# Patient Record
Sex: MALE | Race: WHITE | NOT HISPANIC OR LATINO | Employment: FULL TIME | URBAN - METROPOLITAN AREA
[De-identification: names, ages, dates, MRNs, and addresses within clinical notes are randomized per-mention and may not be internally consistent; named-entity substitution may affect disease eponyms.]

---

## 2023-05-04 LAB
APPEARANCE, URINE: CLEAR
BILIRUBIN, URINE: NEGATIVE
BLOOD, URINE: NEGATIVE
COLOR, URINE: YELLOW
GLUCOSE, URINE: NEGATIVE MG/DL
KETONES, URINE: NEGATIVE MG/DL
LEUKOCYTE ESTERASE, URINE: NEGATIVE
NITRITE, URINE: NEGATIVE
PH, URINE: 6 (ref 5–8)
PROTEIN, URINE: NEGATIVE MG/DL
SPECIFIC GRAVITY, URINE: 1.02 (ref 1–1.03)
UROBILINOGEN, URINE: <2 MG/DL (ref 0–1.9)

## 2023-05-05 LAB — URINE CULTURE: NORMAL

## 2024-01-19 ENCOUNTER — OFFICE VISIT (OUTPATIENT)
Dept: OPHTHALMOLOGY | Facility: CLINIC | Age: 54
End: 2024-01-19
Payer: COMMERCIAL

## 2024-01-19 DIAGNOSIS — H00.15 CHALAZION OF LEFT LOWER EYELID: ICD-10-CM

## 2024-01-19 DIAGNOSIS — H25.13 AGE-RELATED NUCLEAR CATARACT OF BOTH EYES: Primary | ICD-10-CM

## 2024-01-19 DIAGNOSIS — H02.889 MEIBOMIAN GLAND DYSFUNCTION: ICD-10-CM

## 2024-01-19 DIAGNOSIS — H52.7 REFRACTIVE ERROR: ICD-10-CM

## 2024-01-19 PROCEDURE — 99214 OFFICE O/P EST MOD 30 MIN: CPT | Performed by: OPHTHALMOLOGY

## 2024-01-19 RX ORDER — ASPIRIN 81 MG/1
TABLET ORAL
COMMUNITY
Start: 2020-09-03

## 2024-01-19 RX ORDER — MULTIVITAMIN
TABLET ORAL EVERY 24 HOURS
COMMUNITY

## 2024-01-19 RX ORDER — TOBRAMYCIN AND DEXAMETHASONE 3; 1 MG/ML; MG/ML
1 SUSPENSION/ DROPS OPHTHALMIC 3 TIMES DAILY
Qty: 5 ML | Refills: 1 | Status: SHIPPED | OUTPATIENT
Start: 2024-01-19 | End: 2024-02-06 | Stop reason: DRUGHIGH

## 2024-01-19 RX ORDER — POLYMYXIN B SULFATE AND TRIMETHOPRIM 1; 10000 MG/ML; [USP'U]/ML
SOLUTION OPHTHALMIC EVERY 6 HOURS
COMMUNITY
Start: 2023-09-21 | End: 2024-01-19 | Stop reason: ALTCHOICE

## 2024-01-19 RX ORDER — TOBRAMYCIN AND DEXAMETHASONE 3; 1 MG/ML; MG/ML
1 SUSPENSION/ DROPS OPHTHALMIC 3 TIMES DAILY
Qty: 5 ML | Refills: 1 | Status: SHIPPED | OUTPATIENT
Start: 2024-01-19 | End: 2024-01-19 | Stop reason: SDUPTHER

## 2024-01-19 ASSESSMENT — REFRACTION_WEARINGRX
SPECS_TYPE: READERS
OD_SPHERE: +1.50
OS_SPHERE: +1.50

## 2024-01-19 ASSESSMENT — CUP TO DISC RATIO
OS_RATIO: 0.25
OD_RATIO: 0.25

## 2024-01-19 ASSESSMENT — ENCOUNTER SYMPTOMS
MUSCULOSKELETAL NEGATIVE: 0
PSYCHIATRIC NEGATIVE: 0
RESPIRATORY NEGATIVE: 0
ENDOCRINE NEGATIVE: 0
NEUROLOGICAL NEGATIVE: 0
OCCASIONAL FEELINGS OF UNSTEADINESS: 0
LOSS OF SENSATION IN FEET: 0
EYES NEGATIVE: 0
CARDIOVASCULAR NEGATIVE: 0
HEMATOLOGIC/LYMPHATIC NEGATIVE: 0
ALLERGIC/IMMUNOLOGIC NEGATIVE: 0
CONSTITUTIONAL NEGATIVE: 0
GASTROINTESTINAL NEGATIVE: 0
DEPRESSION: 0

## 2024-01-19 ASSESSMENT — TONOMETRY
IOP_METHOD: GOLDMANN APPLANATION
OS_IOP_MMHG: 19
OD_IOP_MMHG: 16

## 2024-01-19 ASSESSMENT — KERATOMETRY
OS_AXISANGLE2_DEGREES: 155
OD_K1POWER_DIOPTERS: 40.50
OD_K2POWER_DIOPTERS: 40.50
OS_K1POWER_DIOPTERS: 40.25
OS_AXISANGLE_DEGREES: 65
OS_K2POWER_DIOPTERS: 40.75
OD_AXISANGLE_DEGREES: 90
OD_AXISANGLE2_DEGREES: 180

## 2024-01-19 ASSESSMENT — PATIENT HEALTH QUESTIONNAIRE - PHQ9
SUM OF ALL RESPONSES TO PHQ9 QUESTIONS 1 AND 2: 0
1. LITTLE INTEREST OR PLEASURE IN DOING THINGS: NOT AT ALL
2. FEELING DOWN, DEPRESSED OR HOPELESS: NOT AT ALL

## 2024-01-19 ASSESSMENT — EXTERNAL EXAM - RIGHT EYE: OD_EXAM: NORMAL

## 2024-01-19 ASSESSMENT — EXTERNAL EXAM - LEFT EYE: OS_EXAM: NORMAL

## 2024-01-19 ASSESSMENT — VISUAL ACUITY
OS_SC+: +1
OD_SC: 20/20
OS_SC: 20/25
METHOD: SNELLEN - LINEAR

## 2024-01-19 ASSESSMENT — PAIN SCALES - GENERAL: PAINLEVEL: 0-NO PAIN

## 2024-01-19 NOTE — ASSESSMENT & PLAN NOTE
Some hyperopia left eye (OS) and suspect needs stronger readers. In setting of lid and surface issues, will hold on RX for now and have trial some stronger readers.

## 2024-01-19 NOTE — ASSESSMENT & PLAN NOTE
Significant lid margin changes both eyes (OU), advised on starting regular warm compress and lid scrubs. Will be doing course of tobradex for chalazia.

## 2024-01-19 NOTE — ASSESSMENT & PLAN NOTE
More recent lower lid left eye (OS), older more chronic lesion right eye (OD). Will start course of tobradex and see how respond. May ultimately need excision of both.

## 2024-01-19 NOTE — PATIENT INSTRUCTIONS
Thank you so much for choosing me to provide your care today!    If you were dilated your vision may remain blurry   or light sensitive for several hours.    The nature of eye and vision problems can require frequent follow up, please make every effort to adhere to any future appointments.    If you have any issues, questions, or concerns,   please do not hesitate to reach out.    If you receive a survey in regards to your care today, please mention any exceptional care my office staff and/or technicians provided.    You can reach our office at this number:  [phone]     Eyelid Care    [x]Warm compress 1-2 time(s) daily to both eye(s)  10-15 minutes on closed eyes  To make a reusable compress:  Place 1 cup dry uncooked rice in a clean sock  Tie a knot  Microwave for 10-20 seconds, may add more time as needed  Compress should be warm but not hot   Replace if soiled or develops bad odor  May follow compress with gentle eyelid massage    [x]Eyelid scrub 1 time(s) daily to both eye(s)  Scrub technique  Apply small amount of baby shampoo/mild soap and water to fingertips  Work into a lather and gently scrub closed lids for 10-15 seconds  Keeping eyes closed, rinse with fresh water

## 2024-01-19 NOTE — PROGRESS NOTES
Assessment/Plan   Problem List Items Addressed This Visit          Eye/Vision problems    Age-related nuclear cataract of both eyes - Primary     Non significant cataract noted on exam. Will plan to continue to monitor with serial exam.            Meibomian gland dysfunction     Significant lid margin changes both eyes (OU), advised on starting regular warm compress and lid scrubs. Will be doing course of tobradex for chalazia.          Chalazion of left lower eyelid     More recent lower lid left eye (OS), older more chronic lesion right eye (OD). Will start course of tobradex and see how respond. May ultimately need excision of both.          Relevant Medications    tobramycin-dexamethasone (Tobradex) ophthalmic suspension    Refractive error     Some hyperopia left eye (OS) and suspect needs stronger readers. In setting of lid and surface issues, will hold on RX for now and have trial some stronger readers.             Provided reassurance regarding above diagnoses and care received in the office visit today. Discussed outcomes and options along with the importance of treatment compliance. Understands the importance of any follow up visits. Patient instructed to call/communicate with our office if any new issues, questions, or concerns.     Will plan to see back in few weeks for short check or sooner PRN

## 2024-01-25 ENCOUNTER — APPOINTMENT (OUTPATIENT)
Dept: OPHTHALMOLOGY | Facility: CLINIC | Age: 54
End: 2024-01-25
Payer: COMMERCIAL

## 2024-02-05 ENCOUNTER — NURSE TRIAGE (OUTPATIENT)
Dept: HEMATOLOGY/ONCOLOGY | Facility: HOSPITAL | Age: 54
End: 2024-02-05
Payer: COMMERCIAL

## 2024-02-05 ENCOUNTER — TELEPHONE (OUTPATIENT)
Dept: HEMATOLOGY/ONCOLOGY | Facility: CLINIC | Age: 54
End: 2024-02-05

## 2024-02-05 DIAGNOSIS — R42 DIZZINESS: ICD-10-CM

## 2024-02-05 DIAGNOSIS — C43.9 METASTATIC MELANOMA (MULTI): Primary | ICD-10-CM

## 2024-02-05 DIAGNOSIS — R51.9 ACUTE NONINTRACTABLE HEADACHE, UNSPECIFIED HEADACHE TYPE: ICD-10-CM

## 2024-02-05 NOTE — TELEPHONE ENCOUNTER
"Additional Information   Commented on: Are you having any pain? Where? On a scale or 0 - 10 with 0 being no pain and 10 being the worst pain ever, how would you rate your pain?     headache   Commented on: What else do you want to tell me about this problem?     Patient calls to state that shortly after [place], he began to experience headaches, sinus pressure and dizziness. He states that the headaches are located in the front of his head and he rates th pain a 5-6/10. He notes that the time of the day that the headaches appear varies and does not seem to be positional in nature. He will sometimes take Tylenol with minimal relief. He states that he has a headache \"most days.\"    He also feels sinus pressure behind his eyes and his head feels \"full.\" He does endorse ongoing mild congestion with clear drainage but states that he does not have any cold or flu symptoms. Denies fever/chills or any sick contacts.    Endorses dizziness that is intermittent and also not positional.No falls. He states that he tries to stay hydrated. Denies SOB, chest pain, N/V or constipation/diarrhea.    Patient states that he is worried that he had \"melanoma on my face and just want to make sure that nothing is going on in my head,\"    Protocols used: Other    Message sent to team.  "

## 2024-02-05 NOTE — TELEPHONE ENCOUNTER
Edited to add: patient initially thought that these symptoms were related to his eyesight. He saw his ophthalmologist who cleared him and was advised to change his reading glasses, which he did. This did not relieve his symptoms.

## 2024-02-05 NOTE — TELEPHONE ENCOUNTER
Per Gia Blackman CNP, she will order imaging and schedule a FUV with pt to go over results. She will follow up with him tomorrow.    Called Elliot back to communicate the plan of care. Understanding verbalized with teach back. No further needs at this time.

## 2024-02-06 ENCOUNTER — OFFICE VISIT (OUTPATIENT)
Dept: OPHTHALMOLOGY | Facility: CLINIC | Age: 54
End: 2024-02-06
Payer: COMMERCIAL

## 2024-02-06 ENCOUNTER — TELEPHONE (OUTPATIENT)
Dept: HEMATOLOGY/ONCOLOGY | Facility: CLINIC | Age: 54
End: 2024-02-06

## 2024-02-06 DIAGNOSIS — H00.15 CHALAZION OF LEFT LOWER EYELID: ICD-10-CM

## 2024-02-06 PROCEDURE — 99212 OFFICE O/P EST SF 10 MIN: CPT | Performed by: OPHTHALMOLOGY

## 2024-02-06 RX ORDER — TOBRAMYCIN AND DEXAMETHASONE 3; 1 MG/ML; MG/ML
SUSPENSION/ DROPS OPHTHALMIC
Qty: 5 ML | Refills: 1 | Status: SHIPPED
Start: 2024-02-06 | End: 2024-03-01 | Stop reason: WASHOUT

## 2024-02-06 ASSESSMENT — ENCOUNTER SYMPTOMS
MUSCULOSKELETAL NEGATIVE: 0
HEMATOLOGIC/LYMPHATIC NEGATIVE: 0
PSYCHIATRIC NEGATIVE: 0
ALLERGIC/IMMUNOLOGIC NEGATIVE: 0
NEUROLOGICAL NEGATIVE: 0
RESPIRATORY NEGATIVE: 0
DEPRESSION: 0
ENDOCRINE NEGATIVE: 0
EYES NEGATIVE: 0
OCCASIONAL FEELINGS OF UNSTEADINESS: 0
CONSTITUTIONAL NEGATIVE: 0
LOSS OF SENSATION IN FEET: 0
GASTROINTESTINAL NEGATIVE: 0
CARDIOVASCULAR NEGATIVE: 0

## 2024-02-06 ASSESSMENT — VISUAL ACUITY
OD_SC+: -2
OS_SC+: -1
METHOD: SNELLEN - LINEAR
OD_SC: 20/25
OS_SC: 20/30

## 2024-02-06 ASSESSMENT — PATIENT HEALTH QUESTIONNAIRE - PHQ9
SUM OF ALL RESPONSES TO PHQ9 QUESTIONS 1 AND 2: 0
2. FEELING DOWN, DEPRESSED OR HOPELESS: NOT AT ALL
1. LITTLE INTEREST OR PLEASURE IN DOING THINGS: NOT AT ALL

## 2024-02-06 ASSESSMENT — TONOMETRY
OS_IOP_MMHG: 20
IOP_METHOD: GOLDMANN APPLANATION

## 2024-02-06 ASSESSMENT — EXTERNAL EXAM - RIGHT EYE: OD_EXAM: NORMAL

## 2024-02-06 ASSESSMENT — PAIN SCALES - GENERAL: PAINLEVEL: 0-NO PAIN

## 2024-02-06 ASSESSMENT — EXTERNAL EXAM - LEFT EYE: OS_EXAM: NORMAL

## 2024-02-06 NOTE — PATIENT INSTRUCTIONS
Thank you so much for choosing me to provide your care today!    If you were dilated your vision may remain blurry   or light sensitive for several hours.    The nature of eye and vision problems can require frequent follow up, please make every effort to adhere to any future appointments.    If you have any issues, questions, or concerns,   please do not hesitate to reach out.    If you receive a survey in regards to your care today, please mention any exceptional care my office staff and/or technicians provided.    You can reach our office at this number:  [phone]

## 2024-02-06 NOTE — ASSESSMENT & PLAN NOTE
Both upper and lower chalazia appear less inflammed, still with significant nodules. Will plan excision next visit. Start taper off drops in interim and will continue heat as best possible. To call if any new issues in interim.

## 2024-02-06 NOTE — PROGRESS NOTES
Assessment/Plan   Problem List Items Addressed This Visit          Eye/Vision problems    Chalazion of left lower eyelid     Both upper and lower chalazia appear less inflammed, still with significant nodules. Will plan excision next visit. Start taper off drops in interim and will continue heat as best possible. To call if any new issues in interim.          Relevant Medications    tobramycin-dexamethasone (Tobradex) ophthalmic suspension       Provided reassurance regarding above diagnoses and care received in the office visit today. Discussed outcomes and options along with the importance of treatment compliance. Understands the importance of any follow up visits. Patient instructed to call/communicate with our office if any new issues, questions, or concerns.     Will plan to see back in few weeks for chalazion excision upper and lower OS or sooner PRN

## 2024-02-08 ENCOUNTER — HOSPITAL ENCOUNTER (OUTPATIENT)
Dept: RADIOLOGY | Facility: CLINIC | Age: 54
Discharge: HOME | End: 2024-02-08
Payer: COMMERCIAL

## 2024-02-08 VITALS — WEIGHT: 195 LBS | HEIGHT: 70 IN | BODY MASS INDEX: 27.92 KG/M2

## 2024-02-08 DIAGNOSIS — C43.9 METASTATIC MELANOMA (MULTI): ICD-10-CM

## 2024-02-08 DIAGNOSIS — R51.9 ACUTE NONINTRACTABLE HEADACHE, UNSPECIFIED HEADACHE TYPE: ICD-10-CM

## 2024-02-08 DIAGNOSIS — R42 DIZZINESS: ICD-10-CM

## 2024-02-08 PROCEDURE — A9575 INJ GADOTERATE MEGLUMI 0.1ML: HCPCS | Performed by: NURSE PRACTITIONER

## 2024-02-08 PROCEDURE — 70553 MRI BRAIN STEM W/O & W/DYE: CPT

## 2024-02-08 PROCEDURE — 2500000004 HC RX 250 GENERAL PHARMACY W/ HCPCS (ALT 636 FOR OP/ED): Performed by: NURSE PRACTITIONER

## 2024-02-08 PROCEDURE — 70553 MRI BRAIN STEM W/O & W/DYE: CPT | Performed by: RADIOLOGY

## 2024-02-08 RX ORDER — GADOTERATE MEGLUMINE 376.9 MG/ML
18 INJECTION INTRAVENOUS
Status: COMPLETED | OUTPATIENT
Start: 2024-02-08 | End: 2024-02-08

## 2024-02-08 RX ADMIN — GADOTERATE MEGLUMINE 18 ML: 376.9 INJECTION INTRAVENOUS at 09:25

## 2024-02-13 ENCOUNTER — OFFICE VISIT (OUTPATIENT)
Dept: HEMATOLOGY/ONCOLOGY | Facility: HOSPITAL | Age: 54
End: 2024-02-13
Payer: COMMERCIAL

## 2024-02-13 VITALS
HEIGHT: 69 IN | DIASTOLIC BLOOD PRESSURE: 90 MMHG | OXYGEN SATURATION: 100 % | SYSTOLIC BLOOD PRESSURE: 153 MMHG | RESPIRATION RATE: 20 BRPM | HEART RATE: 80 BPM | WEIGHT: 202.82 LBS | BODY MASS INDEX: 30.04 KG/M2 | TEMPERATURE: 96.8 F

## 2024-02-13 DIAGNOSIS — C77.9 MELANOMA METASTATIC TO LYMPH NODE (MULTI): ICD-10-CM

## 2024-02-13 DIAGNOSIS — C43.9 MELANOMA OF SKIN (MULTI): Primary | ICD-10-CM

## 2024-02-13 DIAGNOSIS — R51.9 NONINTRACTABLE HEADACHE, UNSPECIFIED CHRONICITY PATTERN, UNSPECIFIED HEADACHE TYPE: ICD-10-CM

## 2024-02-13 PROCEDURE — 1036F TOBACCO NON-USER: CPT | Performed by: INTERNAL MEDICINE

## 2024-02-13 PROCEDURE — 99215 OFFICE O/P EST HI 40 MIN: CPT | Performed by: INTERNAL MEDICINE

## 2024-02-13 ASSESSMENT — PAIN SCALES - GENERAL: PAINLEVEL: 0-NO PAIN

## 2024-02-14 PROBLEM — C43.9 MELANOMA OF SKIN (MULTI): Status: ACTIVE | Noted: 2024-02-14

## 2024-02-14 PROBLEM — C77.9 MELANOMA METASTATIC TO LYMPH NODE (MULTI): Status: ACTIVE | Noted: 2024-02-14

## 2024-02-14 PROBLEM — R51.9 NONINTRACTABLE HEADACHE: Status: ACTIVE | Noted: 2024-02-14

## 2024-02-14 ASSESSMENT — ENCOUNTER SYMPTOMS
HEMOPTYSIS: 0
DIZZINESS: 0
DIARRHEA: 0
EXTREMITY WEAKNESS: 0
DYSURIA: 0
SCLERAL ICTERUS: 0
NUMBNESS: 0
TROUBLE SWALLOWING: 0
PALPITATIONS: 0
FREQUENCY: 0
NAUSEA: 0
LIGHT-HEADEDNESS: 1
CHEST TIGHTNESS: 0
ARTHRALGIAS: 0
HEMATURIA: 0
NECK PAIN: 1
HOT FLASHES: 0
ADENOPATHY: 0
CONSTIPATION: 0
DEPRESSION: 0
VOMITING: 0
APPETITE CHANGE: 0
SORE THROAT: 0
BACK PAIN: 0
HEADACHES: 1
NERVOUS/ANXIOUS: 0
BRUISES/BLEEDS EASILY: 0
EYE PROBLEMS: 0
MYALGIAS: 1
LEG SWELLING: 0
DIFFICULTY URINATING: 0
SHORTNESS OF BREATH: 0
CONFUSION: 0
FATIGUE: 0
FLANK PAIN: 0
FEVER: 0
CHILLS: 0
VOICE CHANGE: 0
ABDOMINAL PAIN: 0
SEIZURES: 0
BLOOD IN STOOL: 0

## 2024-02-14 NOTE — PROGRESS NOTES
".Patient ID: Elliot Donato is a 54 y.o. male.  Referring Physician: No referring provider defined for this encounter.  Primary Care Provider: Julius Walker MD     Chief Complaint   Patient presents with    Follow-up     Melanoma follow up    Melanoma     MRI brain follow up.  Headache        Treatment Synopsis:   Mr. Donato is referred by Dr. Brennon Patrick for management of stage IIIA cutaneous melanoma.     Mr. Donato presented to a dermatologist outside the hospital in April 2021 with a mole on the right side of his nose that had been present for almost a year. Biopsy of the mole showed  melanoma (BW- 1.1mm, non ulcerated, nodular type). He met with Dr. Patrick, and underwent WLE on 05/12/2021 and Mohs surgery on 06/01/2021 with Dr. Mcgarry. SLNB positive- 1/3 node positive. Final stage: Stage IIIA ([id number]). Discussed with patient regarding  adjuvant immunotherapy for stage IIIA melanoma with micrometastatic disease. He agreed to starting Keytruda. Completed 18 cycles on 09/02/2022 and is currently on surveillance. The most recent scans in 09/2023 were unremarkable.     He called in in 02/2024 complaining of headaches which were terrible. They were waking him up from his sleep. Hence, we proceeded to get a stat MRI brain. This did not show any brain mets, but picked up a parotid lesion. I spoke to Dr. Patrick and he is going to meet him soon.         Treatment History:   Treatment:  1. Pembrolizumab 200mg  C1- 09/08/2021, s/p 18 cycles as of 09/02/2022.      Subjective   Please refer to \"Notes/Cancer History\" above for complete History of present illness.     Mr Elliot Donato is here to discuss the results of the MRI. He continues to hav these headaches, however, they are waxing and waining. He is also reporting neck stiffness.      Review of Systems:   Review of Systems   Constitutional:  Negative for appetite change, chills, fatigue and fever.   HENT:   Negative for hearing loss, mouth sores, sore throat, trouble " swallowing and voice change.    Eyes:  Negative for eye problems and icterus.   Respiratory:  Negative for chest tightness, hemoptysis and shortness of breath.    Cardiovascular:  Negative for chest pain, leg swelling and palpitations.   Gastrointestinal:  Negative for abdominal pain, blood in stool, constipation, diarrhea, nausea and vomiting.   Endocrine: Negative for hot flashes.   Genitourinary:  Negative for difficulty urinating, dysuria, frequency, hematuria, nocturia and pelvic pain.    Musculoskeletal:  Positive for myalgias and neck pain. Negative for arthralgias, back pain, flank pain and gait problem.   Skin:  Negative for itching and rash.   Neurological:  Positive for headaches and light-headedness. Negative for dizziness, extremity weakness, gait problem, numbness and seizures.   Hematological:  Negative for adenopathy. Does not bruise/bleed easily.   Psychiatric/Behavioral:  Negative for confusion and depression. The patient is not nervous/anxious.          MEDICAL HISTORY  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 (CMS/HCC)     Optic neuritis     Personal history of other diseases of the musculoskeletal system and connective tissue     History of arthritis    Personal history of other specified conditions 04/22/2021    History of dysuria    Refractive error        FAMILY HISTORY  Family History   Problem Relation Name Age of Onset    Diabetes Mother         TOBACCO HISTORY  Tobacco Use: Low Risk  (2/13/2024)    Patient History     Smoking Tobacco Use: Never     Smokeless Tobacco Use: Never     Passive Exposure: Not on file       SOCIAL HISTORY  Social Connections: Not on file        Outpatient Medication Profile:  Current Outpatient Medications on File Prior to Visit   Medication Sig Dispense Refill    aspirin 81 mg EC tablet Take by mouth.      multivit with min-folic acid 0.4 mg tablet once every  "24 hours.      tobramycin-dexamethasone (Tobradex) ophthalmic suspension Twice daily to left eye for 1 week, then once daily for 1 week, then STOP 5 mL 1     No current facility-administered medications on file prior to visit.         Performance Status:  Asymptomatic     Vitals and Measurements:   /90   Pulse 80   Temp 36 °C (96.8 °F) (Core)   Resp 20   Ht (S) 1.758 m (5' 9.21\")   Wt 92 kg (202 lb 13.2 oz)   SpO2 100%   BMI 29.77 kg/m²       Physical Exam:   Physical Exam  Constitutional:       General: He is not in acute distress.     Appearance: Normal appearance. He is not ill-appearing.   HENT:      Head: Normocephalic and atraumatic.   Eyes:      Extraocular Movements: Extraocular movements intact.      Conjunctiva/sclera: Conjunctivae normal.      Pupils: Pupils are equal, round, and reactive to light.   Neck:      Vascular: No carotid bruit.      Trachea: Trachea and phonation normal.      Comments: Mild stiffness in the right neck  Cardiovascular:      Rate and Rhythm: Normal rate and regular rhythm.      Pulses: Normal pulses.   Pulmonary:      Effort: Pulmonary effort is normal.      Breath sounds: No wheezing, rhonchi or rales.   Abdominal:      General: Abdomen is flat. Bowel sounds are normal.      Palpations: There is no mass.      Tenderness: There is no abdominal tenderness. There is no rebound.   Musculoskeletal:         General: Normal range of motion.      Cervical back: Neck supple. No rigidity. Normal range of motion.   Lymphadenopathy:      Cervical: No cervical adenopathy.      Right cervical: No superficial or deep cervical adenopathy.     Left cervical: No superficial or deep cervical adenopathy.   Skin:     General: Skin is warm.   Neurological:      General: No focal deficit present.      Mental Status: He is alert and oriented to person, place, and time.      Cranial Nerves: No cranial nerve deficit or facial asymmetry.      Motor: No weakness or abnormal muscle tone.      " Coordination: Coordination normal. Finger-Nose-Finger Test normal.      Gait: Gait normal.   Psychiatric:         Mood and Affect: Mood normal.         Behavior: Behavior normal.         Thought Content: Thought content normal.         Judgment: Judgment normal.              Lab Results:  I have reviewed these laboratory results:     No visits with results within 1 Month(s) from this visit.   Latest known visit with results is:   Legacy Encounter on 09/18/2023   Component Date Value Ref Range Status    LD 09/18/2023 164  84 - 246 U/L Final    Glucose 09/18/2023 82  74 - 99 mg/dL Final    Sodium 09/18/2023 139  136 - 145 mmol/L Final    Potassium 09/18/2023 4.6  3.5 - 5.3 mmol/L Final    Chloride 09/18/2023 104  98 - 107 mmol/L Final    Bicarbonate 09/18/2023 27  21 - 32 mmol/L Final    Anion Gap 09/18/2023 13  10 - 20 mmol/L Final    Urea Nitrogen 09/18/2023 18  6 - 23 mg/dL Final    Creatinine 09/18/2023 0.97  0.50 - 1.30 mg/dL Final    GFR MALE 09/18/2023 >90  >90 mL/min/1.73m2 Final    Calcium 09/18/2023 9.7  8.6 - 10.6 mg/dL Final    Albumin 09/18/2023 4.7  3.4 - 5.0 g/dL Final    Alkaline Phosphatase 09/18/2023 55  33 - 120 U/L Final    Total Protein 09/18/2023 7.2  6.4 - 8.2 g/dL Final    AST 09/18/2023 19  9 - 39 U/L Final    Total Bilirubin 09/18/2023 1.1  0.0 - 1.2 mg/dL Final    ALT (SGPT) 09/18/2023 27  10 - 52 U/L Final    TSH 09/18/2023 1.60  0.44 - 3.98 mIU/L Final    Creatinine 09/18/2023 1.00  0.60 - 1.30 mg/dL Final    GFR MALE 09/18/2023 89  >90 mL/min/1.73m2 Final    WBC 09/18/2023 6.8  4.4 - 11.3 x10E9/L Final    RBC 09/18/2023 5.10  4.50 - 5.90 x10E12/L Final    Hemoglobin 09/18/2023 15.3  13.5 - 17.5 g/dL Final    Hematocrit 09/18/2023 44.2  41.0 - 52.0 % Final    MCV 09/18/2023 87  80 - 100 fL Final    MCHC 09/18/2023 34.6  32.0 - 36.0 g/dL Final    Platelets 09/18/2023 217  150 - 450 x10E9/L Final    RDW 09/18/2023 12.9  11.5 - 14.5 % Final    Neutrophils % 09/18/2023 58.2  40.0 - 80.0 %  Final    Immature Granulocytes %, Automated 09/18/2023 0.3  0.0 - 0.9 % Final    Lymphocytes % 09/18/2023 32.6  13.0 - 44.0 % Final    Monocytes % 09/18/2023 8.2  2.0 - 10.0 % Final    Basophils % 09/18/2023 0.7  0.0 - 2.0 % Final    Neutrophils Absolute 09/18/2023 3.95  1.20 - 7.70 x10E9/L Final    Lymphocytes Absolute 09/18/2023 2.22  1.20 - 4.80 x10E9/L Final    Monocytes Absolute 09/18/2023 0.56  0.10 - 1.00 x10E9/L Final    Basophils Absolute 09/18/2023 0.05  0.00 - 0.10 x10E9/L Final         Radiology Result:  I have reviewed the latest Imaging in PACS and the findings are noted in this note. I discussed the results of the latest imaging with the patient. All previous imaging were reviewed at the time it was completed. Full records are available in the EMR for review as well.     MR brain w and wo IV contrast    Result Date: 2/8/2024  Impression: 1. No evidence of intracranial metastatic disease. 2. There is a 1 cm ovoid T2 hyperintense nodule within the superficial parotid gland on the right not visualized on the prior CT neck 09/18/2023. Attention on follow-up exams.     I personally reviewed the images/study and I agree with Dr. Sia Mendez findings as stated. This study was interpreted at [place], [place]   MACRO: None   Signed by: Gia Ramos 2/8/2024 10:09 AM Dictation workstation:   [id number]        Pathology Results:  I have reviewed the full pathology report recorded in the EMR. The pertinent portions indicating diagnosis are listed here in the note. for details please refer to the full report recorded in the EMR.    Dermatopathology [May 3 2021 3:01PM] ([id number])    Specimens: 3 SLIDES, DERMPATH DIAGNOSTICS [facility], #[id number] [[id number]]  (BX:     Accession #: [id number]   Pathologist: EDUARDO MATUTE MD   Date of Procedure: 4/30/2021   Date Received: 4/30/2021   Submitting Physician: RAIZA ESTRELLA MD   Location: [facility]   Copy To/Referring/Attending:    NEIDA SANCHEZ DO     FINAL DIAGNOSIS   3 SLIDES, DERMPATH DIAGNOSTICS - [place], #[id number] [A] (BX:   04/12/2021)     A. SKIN, NASAL DORSUM, SHAVE BIOPSY:   MALIGNANT MELANOMA, BRESLOW THICKNESS AT LEAST 1.1 MM, PRESENT  ON THE DEEP AND   PERIPHERAL MARGIN, SEE NOTE.     Note: Microscopic examination reveals a specimen that extends into the mid reticular dermis. There is an asymmetric proliferation of nested and single atypical melanocytes along the dermal-epidermal  junction. There are spindled melanocytes in the dermis. The melanocytes stain with antibodies against SOX-10 and Melan-A.     B. SKIN, LEFT LATERAL ABDOMEN, SHAVE BIOPSY:   SLIDES NOT RECEIVED     Electronically Signed Out by EDUARDO MATUTE M.D  Electronically Signed Out By EDUARDO MATUTE MD/TEMI      Assessment and Plan:   Assessment/Plan      Mr. Elliot Donato is a 54 y.o. male with a diagnosis of stage III melanoma. Please see the evolution of the case listed above in the cancer history.     Today,   We discussed the brain MRI results. There is no evidence of brain metastasis. We will continue regular surveillance. For the parotid lesion I referred him to Dr. Patrick. He will follow up with his PCP regarding the headaches, which are of non-oncologic origin in my opinion, especially after no cortical lesions were identified.     # Cutaneous Melanoma IIIA  - Completed 18 cycles of Pembrolizumab 200mg on 09/02/2022.  - next scan in 09/2024.     # Headache  - Follow up with PCP.     # Parotid lesion  - Follow up with Dr. Brennon Patrick.     # Cystic lesion in pancreas  - Met with LUIS EDUARDO Roa for evaluation.   - MRCP 11/02/2023 showing no lesion or ductal dilation. Possible lesion seen on CT could be a fat lobule. We will continue to monitor on CT scans.      # Health maintenance  - Following with PCP for age appropriate cancer screenings.      DISCLAIMER:   In preparing for this visit and writing this note, I reviewed all the previous  electronic medical records (labs, imaging and medical charts) of the patient available in the physician portal. Significant findings which helped in decision making are recorded  in this chart.     The plan was discussed with the patient. We gave him ample opportunities to ask questions. All questions were answered to his satisfaction and he verbalized understanding.       Seb Blood MD    Hematology and Oncology     Phone: [phone]  Fax: [phone].    INSTRUCTIONS FOR PATIENT  Mr. Elliot Donato ,  It was a pleasure talking to you today.    As discussed, we will be conducting surveillance scans in 09/2024. Please get CT scans and lab work done prior to next visit. Please keep your appointments with dermatology and surgical oncology.     Please make sure to schedule your appointments as we discussed. Your appointments should also appear in your MyChart.   In case of an emergency please dial 911 or report to your nearest Emergency Room.  For all other questions, please do not hesitate to reach out to us at the number listed below.    Thank you for choosing [facility] at [facility].   We appreciate your visit.     MD Gia Garcia APRN Taylor Costello, SACHIN ([facility] location)  Kayce Lockhart, SACHIN ([place] location)    Sarcoma and Cutaneous Oncology team    Phone: [phone]  Fax: [phone].

## 2024-02-22 ENCOUNTER — CLINICAL SUPPORT (OUTPATIENT)
Dept: OPHTHALMOLOGY | Facility: CLINIC | Age: 54
End: 2024-02-22
Payer: COMMERCIAL

## 2024-02-22 ENCOUNTER — OFFICE VISIT (OUTPATIENT)
Dept: OPHTHALMOLOGY | Facility: CLINIC | Age: 54
End: 2024-02-22
Payer: COMMERCIAL

## 2024-02-22 DIAGNOSIS — H00.15 CHALAZION OF LEFT LOWER EYELID: Primary | ICD-10-CM

## 2024-02-22 DIAGNOSIS — H00.14 CHALAZION LEFT UPPER EYELID: ICD-10-CM

## 2024-02-22 PROCEDURE — 67805 REMOVE EYELID LESIONS: CPT | Mod: LEFT SIDE | Performed by: OPHTHALMOLOGY

## 2024-02-22 RX ORDER — ERYTHROMYCIN 5 MG/G
OINTMENT OPHTHALMIC 3 TIMES DAILY
Qty: 3.5 G | Refills: 0 | Status: SHIPPED | OUTPATIENT
Start: 2024-02-22 | End: 2024-02-25

## 2024-02-22 ASSESSMENT — ENCOUNTER SYMPTOMS
NEUROLOGICAL NEGATIVE: 0
RESPIRATORY NEGATIVE: 0
EYES NEGATIVE: 0
LOSS OF SENSATION IN FEET: 0
CARDIOVASCULAR NEGATIVE: 0
HEMATOLOGIC/LYMPHATIC NEGATIVE: 0
DEPRESSION: 0
PSYCHIATRIC NEGATIVE: 0
GASTROINTESTINAL NEGATIVE: 0
OCCASIONAL FEELINGS OF UNSTEADINESS: 0
CONSTITUTIONAL NEGATIVE: 0
MUSCULOSKELETAL NEGATIVE: 0
ENDOCRINE NEGATIVE: 0
ALLERGIC/IMMUNOLOGIC NEGATIVE: 0

## 2024-02-22 ASSESSMENT — PATIENT HEALTH QUESTIONNAIRE - PHQ9
2. FEELING DOWN, DEPRESSED OR HOPELESS: NOT AT ALL
SUM OF ALL RESPONSES TO PHQ9 QUESTIONS 1 AND 2: 0
1. LITTLE INTEREST OR PLEASURE IN DOING THINGS: NOT AT ALL

## 2024-02-22 ASSESSMENT — PAIN SCALES - GENERAL: PAINLEVEL: 0-NO PAIN

## 2024-02-22 NOTE — ASSESSMENT & PLAN NOTE
Tolerated excision of both lids well with good expression of gland contents. Will send in erythromycin ointment for TID use for 3 days. See back in 1 week for post op or sooner PRN.

## 2024-02-22 NOTE — PROGRESS NOTES
Assessment/Plan   Problem List Items Addressed This Visit          Eye/Vision problems    Chalazion of left lower eyelid - Primary     Tolerated excision of both lids well with good expression of gland contents. Will send in erythromycin ointment for TID use for 3 days. See back in 1 week for post op or sooner PRN.          Relevant Medications    erythromycin (Romycin) 5 mg/gram (0.5 %) ophthalmic ointment    Other Relevant Orders    Excision of Chalazion, Multiple, Different Lids - OS - Left Eye (Completed)    Chalazion left upper eyelid    Relevant Medications    erythromycin (Romycin) 5 mg/gram (0.5 %) ophthalmic ointment    Other Relevant Orders    Excision of Chalazion, Multiple, Different Lids - OS - Left Eye (Completed)       Provided reassurance regarding above diagnoses and care received in the office visit today. Discussed outcomes and options along with the importance of treatment compliance. Understands the importance of any follow up visits. Patient instructed to call/communicate with our office if any new issues, questions, or concerns.     Will plan to see back in 1 week post op or sooner PRN

## 2024-02-22 NOTE — PATIENT INSTRUCTIONS
Thank you so much for choosing me to provide your in office procedure today!    Please follow any instructions we discussed for post procedural care.   If you experience any concerning side effects or problems do not hesitate to reach out to our office, even after business hours.    If you were dilated your vision may remain blurry   or light sensitive for several hours.    The nature of eye and vision problems can require frequent follow up, please make every effort to adhere to any future appointments.    If you receive a survey in regards to your care today, please mention any exceptional care my office staff and/or technicians provided.    You can reach our office at this number with any other questions or concerns:  [phone]

## 2024-03-01 ENCOUNTER — OFFICE VISIT (OUTPATIENT)
Dept: OPHTHALMOLOGY | Facility: CLINIC | Age: 54
End: 2024-03-01
Payer: COMMERCIAL

## 2024-03-01 DIAGNOSIS — H00.14 CHALAZION LEFT UPPER EYELID: Primary | ICD-10-CM

## 2024-03-01 PROCEDURE — 99024 POSTOP FOLLOW-UP VISIT: CPT | Performed by: OPHTHALMOLOGY

## 2024-03-01 ASSESSMENT — EXTERNAL EXAM - RIGHT EYE: OD_EXAM: NORMAL

## 2024-03-01 ASSESSMENT — VISUAL ACUITY
METHOD: SNELLEN - LINEAR
OD_SC+: -2
OS_SC+: -2
OS_SC: 20/25
OD_SC: 20/20

## 2024-03-01 ASSESSMENT — ENCOUNTER SYMPTOMS
CONSTITUTIONAL NEGATIVE: 0
ENDOCRINE NEGATIVE: 0
ALLERGIC/IMMUNOLOGIC NEGATIVE: 0
CARDIOVASCULAR NEGATIVE: 0
OCCASIONAL FEELINGS OF UNSTEADINESS: 0
GASTROINTESTINAL NEGATIVE: 0
MUSCULOSKELETAL NEGATIVE: 0
LOSS OF SENSATION IN FEET: 0
PSYCHIATRIC NEGATIVE: 0
DEPRESSION: 0
NEUROLOGICAL NEGATIVE: 0
RESPIRATORY NEGATIVE: 0
EYES NEGATIVE: 0
HEMATOLOGIC/LYMPHATIC NEGATIVE: 0

## 2024-03-01 ASSESSMENT — PAIN SCALES - GENERAL: PAINLEVEL: 0-NO PAIN

## 2024-03-01 ASSESSMENT — EXTERNAL EXAM - LEFT EYE: OS_EXAM: NORMAL

## 2024-03-01 NOTE — ASSESSMENT & PLAN NOTE
Good resolution in lid nodule, slight residual external spot. Advised OK to continue with just heat only. No need to use tobradex again. Will monitor with serial exam.

## 2024-03-01 NOTE — PROGRESS NOTES
Assessment/Plan   Problem List Items Addressed This Visit       Chalazion of left lower eyelid     Good resolution, mild residual nodule. Advised on heat and will monitor with serial exam.          Chalazion left upper eyelid - Primary     Good resolution in lid nodule, slight residual external spot. Advised OK to continue with just heat only. No need to use tobradex again. Will monitor with serial exam.             Provided reassurance regarding above diagnoses and care received in the office visit today. Discussed outcomes and options along with the importance of treatment compliance. Understands the importance of any follow up visits. Patient instructed to call/communicate with our office if any new issues, questions, or concerns.     Will plan to see back in 6 months full or sooner PRN

## 2024-03-01 NOTE — PATIENT INSTRUCTIONS
Thank you so much for choosing me to provide your care today!    If you were dilated your vision may remain blurry   or light sensitive for several hours.    The nature of eye and vision problems can require frequent follow up, please make every effort to adhere to any future appointments.    If you have any issues, questions, or concerns,   please do not hesitate to reach out.    If you receive a survey in regards to your care today, please mention any exceptional care my office staff and/or technicians provided.    You can reach our office at this number:  [phone]

## 2024-03-18 ENCOUNTER — OFFICE VISIT (OUTPATIENT)
Dept: OTOLARYNGOLOGY | Facility: CLINIC | Age: 54
End: 2024-03-18
Payer: COMMERCIAL

## 2024-03-18 VITALS — TEMPERATURE: 97.6 F | WEIGHT: 206 LBS | BODY MASS INDEX: 30.23 KG/M2

## 2024-03-18 DIAGNOSIS — K11.8 MASS OF RIGHT PAROTID GLAND: Primary | ICD-10-CM

## 2024-03-18 PROCEDURE — 1036F TOBACCO NON-USER: CPT | Performed by: OTOLARYNGOLOGY

## 2024-03-18 PROCEDURE — 99213 OFFICE O/P EST LOW 20 MIN: CPT | Performed by: OTOLARYNGOLOGY

## 2024-03-18 NOTE — PROGRESS NOTES
ENT Follow up Visit    History Of Present Illness  Elliot Donato is a 54 y.o. male presents for follow up of a right cheek melanoma with micromets on SLN in an external jugular node    Recall:52 yo man who presents for evaluation of a nasal melanoma. He had a spot on the right side of his nose under his eyee for the past year or so, looked like a mole. 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 He has no symptoms from this. past smoker for 10 years, quit many years ago. No other medical problems.     5-21-21 post-op: doing well, some swelling under the chin, some drainage, no significant pain   3-18-24 FU: completed 1 year of pembro, no issues, had a recent mRI due to headaches and there was a small right parotid lesion seen. 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    Surgical History  He has a past surgical history that includes Other surgical history (09/03/2020).     Social History  He reports that he has never smoked. He has never used smokeless tobacco. He reports current alcohol use. He reports that he does not use drugs.    Family History  Family History   Problem Relation Name Age of Onset    Diabetes Mother          Allergies  Adhesive tape-silicones     Physical Exam:  nad alert  rishi eomi ncAT  Right nasolabial wound has healed well   r parotid incision well healed, no palpable masses or KHANG  right submandibular and submental incisions well healed       Last Recorded Vitals  Temperature 36.4 °C (97.6 °F), weight 93.4 kg (206 lb).    Relevant Results    Excision of Chalazion, Multiple, Different Lids - OS - Left  Eye    Result Date: 2/22/2024  Patient identified and informed consent obtained. Upper and lower lids prepped with alcohol swabs. <1cc of 2% lidocaine with epinephrine injected into eyelids near lesions. A clamp was used to flor the surgical site in both cases. Tetracaine on a swab was placed at incision site. An eleven blad was used to enter meibomian gland space. A currette was used to excise glandular material and debris. Clamp was removed and hemostasis obtained with gentle pressure. Thin ribbon of erythromycin ointment applied to the eye. Patient tolerated well. Lidocaine LOT: [id number] Exp:2/2025    Assessment and Plan  54 y.o. male with a right facial melanoma s/p Mohs and SLNB with pos external jugular node, neg parotid and level I nodes  Stage  IIIA [id number]  Reviewed mRI brain - new right parotid 1 cm cystic appearing lesion    -will order an US FNA for the right parotid lesion  -follow up on next set of CT scans  -will call with results.      Brennon Patrick MD

## 2024-04-01 ENCOUNTER — HOSPITAL ENCOUNTER (OUTPATIENT)
Dept: RADIOLOGY | Facility: HOSPITAL | Age: 54
Discharge: HOME | End: 2024-04-01
Payer: COMMERCIAL

## 2024-04-01 VITALS
TEMPERATURE: 98.2 F | HEART RATE: 75 BPM | DIASTOLIC BLOOD PRESSURE: 82 MMHG | RESPIRATION RATE: 16 BRPM | SYSTOLIC BLOOD PRESSURE: 116 MMHG | OXYGEN SATURATION: 98 %

## 2024-04-01 DIAGNOSIS — K11.8 MASS OF RIGHT PAROTID GLAND: ICD-10-CM

## 2024-04-01 PROCEDURE — 76942 ECHO GUIDE FOR BIOPSY: CPT

## 2024-04-01 PROCEDURE — 88341 IMHCHEM/IMCYTCHM EA ADD ANTB: CPT | Performed by: STUDENT IN AN ORGANIZED HEALTH CARE EDUCATION/TRAINING PROGRAM

## 2024-04-01 PROCEDURE — 99152 MOD SED SAME PHYS/QHP 5/>YRS: CPT | Performed by: RADIOLOGY

## 2024-04-01 PROCEDURE — 76982 USE 1ST TARGET LESION: CPT

## 2024-04-01 PROCEDURE — 88305 TISSUE EXAM BY PATHOLOGIST: CPT | Mod: TC,SUR | Performed by: STUDENT IN AN ORGANIZED HEALTH CARE EDUCATION/TRAINING PROGRAM

## 2024-04-01 PROCEDURE — 2500000004 HC RX 250 GENERAL PHARMACY W/ HCPCS (ALT 636 FOR OP/ED): Performed by: RADIOLOGY

## 2024-04-01 PROCEDURE — 42400 BIOPSY OF SALIVARY GLAND: CPT | Performed by: RADIOLOGY

## 2024-04-01 PROCEDURE — 42400 BIOPSY OF SALIVARY GLAND: CPT

## 2024-04-01 PROCEDURE — 99152 MOD SED SAME PHYS/QHP 5/>YRS: CPT

## 2024-04-01 PROCEDURE — 76942 ECHO GUIDE FOR BIOPSY: CPT | Performed by: RADIOLOGY

## 2024-04-01 PROCEDURE — 88305 TISSUE EXAM BY PATHOLOGIST: CPT | Performed by: STUDENT IN AN ORGANIZED HEALTH CARE EDUCATION/TRAINING PROGRAM

## 2024-04-01 PROCEDURE — 88342 IMHCHEM/IMCYTCHM 1ST ANTB: CPT | Performed by: STUDENT IN AN ORGANIZED HEALTH CARE EDUCATION/TRAINING PROGRAM

## 2024-04-01 PROCEDURE — 2720000007 HC OR 272 NO HCPCS

## 2024-04-01 RX ORDER — FENTANYL CITRATE 50 UG/ML
INJECTION, SOLUTION INTRAMUSCULAR; INTRAVENOUS
Status: COMPLETED | OUTPATIENT
Start: 2024-04-01 | End: 2024-04-01

## 2024-04-01 RX ORDER — MIDAZOLAM HYDROCHLORIDE 1 MG/ML
INJECTION INTRAMUSCULAR; INTRAVENOUS
Status: COMPLETED | OUTPATIENT
Start: 2024-04-01 | End: 2024-04-01

## 2024-04-01 RX ADMIN — MIDAZOLAM HYDROCHLORIDE 1 MG: 1 INJECTION, SOLUTION INTRAMUSCULAR; INTRAVENOUS at 13:33

## 2024-04-01 RX ADMIN — FENTANYL CITRATE 50 MCG: 50 INJECTION, SOLUTION INTRAMUSCULAR; INTRAVENOUS at 13:30

## 2024-04-01 RX ADMIN — FENTANYL CITRATE 50 MCG: 50 INJECTION, SOLUTION INTRAMUSCULAR; INTRAVENOUS at 13:33

## 2024-04-01 RX ADMIN — MIDAZOLAM HYDROCHLORIDE 1 MG: 1 INJECTION, SOLUTION INTRAMUSCULAR; INTRAVENOUS at 13:30

## 2024-04-01 ASSESSMENT — PAIN - FUNCTIONAL ASSESSMENT
PAIN_FUNCTIONAL_ASSESSMENT: 0-10

## 2024-04-01 ASSESSMENT — PAIN SCALES - GENERAL
PAINLEVEL_OUTOF10: 0 - NO PAIN

## 2024-04-01 NOTE — PRE-PROCEDURE NOTE
INTERVENTIONAL RADIOLOGY PRE-PROCEDURE NOTE    Elliot Donato is a 54 y.o. male with PMHx of right facial melanoma s/p Mohs and SLNB with pos external jugular node, neg parotid and level I nodes. Stage  IIIA [id number]. MRI Brain 2/8/24 demonstrated a new right parotid/paraparotid lesion. He presents to the interventional radiology department for biopsy of this lesion.    Procedure: US guided percutaneous biopsy of right parotid/para-partoid lesion.     Indication for procedure: The encounter diagnosis was Mass of right parotid gland.    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 (CMS/HCC)     Optic neuritis     Personal history of other diseases of the musculoskeletal system and connective tissue     History of arthritis    Personal history of other specified conditions 04/22/2021    History of dysuria    Refractive error       Past Surgical History:   Procedure Laterality Date    OTHER SURGICAL HISTORY  09/03/2020    No history of surgery       Relevant Labs:   Lab Results   Component Value Date    CREATININE 1.00 09/18/2023    EGFR 89 03/21/2023       Planned Sedation/Anesthesia: Moderate    Directed physical examination:    General: Normal appearance, behavior, cognition and NAD  Heart: Heart regular rate and rhythm  Lungs: No increased work of breathing  Abdomen: soft and nontender  Neurologic: negative      Current Outpatient Medications:     aspirin 81 mg EC tablet, Take by mouth., Disp: , Rfl:     multivit with min-folic acid 0.4 mg tablet, once every 24 hours., Disp: , Rfl:   No current facility-administered medications for this encounter.     Mallampati: II (hard and soft palate, upper portion of tonsils anduvula visible)    ASA Score: ASA 2 - Patient with mild systemic disease with no functional limitations    Benefits, risks and alternatives of procedure and planned sedation have been discussed with the  patient and/or their representative. All questions answered and they agree to proceed.     Taran Maradiaga, PGY-3  Diagnostic Radiology    NON-Urgent on call weekends and after hours weekdays (5pm - 5am) IR pager: [phone]  Urgent & emergent on call weekends and after hours weekdays (5pm-7am) IR pager: [phone]

## 2024-04-01 NOTE — POST-PROCEDURE NOTE
Interventional Radiology Post-Procedure Note    US guided percutaneous biopsy of right parotid/para-partoid lesion.      Indication for procedure: The encounter diagnosis was Mass of right parotid gland.    Pre-Procedure Verification and Time Out:  · Procedure Location procedure area   · HUDDLE - Pre-procedure Verification completed   · TIME OUT - Final Verification completed immediately prior to procedure start   · DEBRIEF completed     General Information:  Date/Time of Procedure: 04/01/24 at 4:55 PM  Indication(s)/Pre - Procedure Diagnoses: Right facial melanoma s/p Mohs and SLNB with pos external jugular node, neg parotid and level I nodes. Stage  IIIA [id number]. MRI Brain 2/8/24 demonstrated a new right parotid/paraparotid lesion. Concern for mets.   Post-Procedure Diagnosis: As above.   Procedure Name: US guided percutaneous biopsy of right parotid/para-partoid lesion.  Findings: See PACS  Procedure performed by:   Dr. Amy Vang MD  Assistant(s):Taran Maradiaga MD  Estimated Blood Loss (mL): minimal  Specimen: Yes, sent to surgical pathology.   Informed Consent: written consent obtained    Procedure Details:    Technically successful and uncomplicated US guided percutaneous biopsy of right parotid/para-partoid lesion.    Please see PACS for full procedural details.    Patient Tolerance: good  Complications: None    Prep:  Ultrasound Guided Insertion: Yes  Large Drape, Hand Hygiene, Surgical Cap, Surgical Mask, Sterile Gloves, Glasses, and Scrubs  Patient Position: Supine  Site Prep: chlorhexidine, draped, usual sterile procedure followed    Anesthesia/Medications:  Procedural Sedation:  Medications (Filter: Administrations occurring from 1322 to 1341 on 04/01/24) As of 04/01/24 1341      fentaNYL PF (Sublimaze) injection (mcg) Total dose:  100 mcg      Date/Time Rate/Dose/Volume Action       04/01/24  1330 50 mcg Given      1333 50 mcg Given               midazolam (Versed) injection (mg) Total dose:  2 mg       Date/Time Rate/Dose/Volume Action       04/01/24  1330 1 mg Given      1333 1 mg Given                   Fentanyl: 100 mcg  Versed: 2 mg  1% Lidocaine: 8 mL    Attending Attestation:  I was present for the entire procedure    Taran Maradiaga, PGY-3  Diagnostic Radiology    NON-Urgent on call weekends and after hours weekdays (5pm - 5am) IR pager: [phone]  Urgent & emergent on call weekends and after hours weekdays (5pm-7am) IR pager: [phone]

## 2024-04-01 NOTE — DISCHARGE INSTRUCTIONS
You received moderate sedation:  - Do not drive a car, or operate any machinery or power tools of any kind.  - Do not drink any alcoholic drinks.  - Do not take any over the counter medications that may cause drowsiness.  - Do not make any important decisions or sign any legal documents.  - You need to have a responsible adult accompany you home.  - You may resume your normal diet.  - We strongly suggest that a responsible adult be with you for the rest of the day and also during the night. This is for your protection and safety.     For questions related to your procedure:  Please call [phone] between the hours of 7:00am-5:00pm Monday through Friday.  Please call [phone] after 5:00pm and on weekends and holidays.     In the event of an emergency call 911 or go to your nearest emergency room.

## 2024-04-04 LAB
LAB AP ASR DISCLAIMER: NORMAL
LABORATORY COMMENT REPORT: NORMAL
PATH REPORT.FINAL DX SPEC: NORMAL
PATH REPORT.GROSS SPEC: NORMAL
PATH REPORT.RELEVANT HX SPEC: NORMAL
PATH REPORT.TOTAL CANCER: NORMAL
RESIDENT REVIEW: NORMAL

## 2024-05-02 ENCOUNTER — TELEPHONE (OUTPATIENT)
Dept: OPERATING ROOM | Facility: HOSPITAL | Age: 54
End: 2024-05-02
Payer: COMMERCIAL

## 2024-05-02 NOTE — TELEPHONE ENCOUNTER
Called pt with results of biopsy, normal parotid gland, lymph node.s no melanoma.  The result was sent to the wrong physician it looks like so I never got it in my in box.  Will plan to follow up repeat scan in a few months

## 2024-05-28 PROBLEM — R10.9 LEFT FLANK PAIN: Status: ACTIVE | Noted: 2024-05-28

## 2024-05-28 PROBLEM — R30.0 DYSURIA: Status: ACTIVE | Noted: 2024-05-28

## 2024-05-28 PROBLEM — K11.8 MASS OF RIGHT PAROTID GLAND: Status: ACTIVE | Noted: 2024-05-28

## 2024-05-28 PROBLEM — K86.2 PANCREATIC CYST (HHS-HCC): Status: ACTIVE | Noted: 2024-05-28

## 2024-05-28 PROBLEM — K60.1 CHRONIC ANAL FISSURE: Status: ACTIVE | Noted: 2024-05-28

## 2024-05-28 PROBLEM — N35.919 URETHRAL STRICTURE: Status: ACTIVE | Noted: 2024-05-28

## 2024-05-28 PROBLEM — Z86.711 HX OF PULMONARY EMBOLUS: Status: ACTIVE | Noted: 2024-05-28

## 2024-05-28 PROBLEM — E78.5 HYPERLIPIDEMIA: Status: ACTIVE | Noted: 2024-05-28

## 2024-05-28 PROBLEM — R73.9 HYPERGLYCEMIA: Status: ACTIVE | Noted: 2023-03-21

## 2024-05-28 PROBLEM — N39.0 UTI (URINARY TRACT INFECTION): Status: ACTIVE | Noted: 2024-05-28

## 2024-05-28 PROBLEM — S01.501D UNSPECIFIED OPEN WOUND OF LIP, SUBSEQUENT ENCOUNTER: Status: ACTIVE | Noted: 2023-06-20

## 2024-05-28 PROBLEM — R03.0 ELEVATED BLOOD PRESSURE READING WITHOUT DIAGNOSIS OF HYPERTENSION: Status: ACTIVE | Noted: 2024-05-28

## 2024-05-28 PROBLEM — E66.3 OVERWEIGHT WITH BODY MASS INDEX (BMI) 25.0-29.9: Status: ACTIVE | Noted: 2024-05-28

## 2024-05-28 PROBLEM — R42 DIZZINESS: Status: ACTIVE | Noted: 2024-02-08

## 2024-05-28 PROBLEM — D48.5 NEOPLASM OF UNCERTAIN BEHAVIOR OF SKIN: Status: ACTIVE | Noted: 2021-12-09

## 2024-05-28 PROBLEM — M54.30 SCIATICA: Status: ACTIVE | Noted: 2024-05-28

## 2024-05-28 PROBLEM — K76.0 FATTY LIVER: Status: ACTIVE | Noted: 2024-05-28

## 2024-05-28 PROBLEM — Z85.820 HISTORY OF MALIGNANT MELANOMA OF SKIN: Status: ACTIVE | Noted: 2024-05-28

## 2024-06-04 ENCOUNTER — OFFICE VISIT (OUTPATIENT)
Dept: UROLOGY | Facility: CLINIC | Age: 54
End: 2024-06-04
Payer: COMMERCIAL

## 2024-06-04 VITALS
DIASTOLIC BLOOD PRESSURE: 82 MMHG | SYSTOLIC BLOOD PRESSURE: 136 MMHG | BODY MASS INDEX: 28.77 KG/M2 | HEIGHT: 70 IN | WEIGHT: 201 LBS | TEMPERATURE: 97.8 F

## 2024-06-04 DIAGNOSIS — N30.00 ACUTE CYSTITIS WITHOUT HEMATURIA: ICD-10-CM

## 2024-06-04 DIAGNOSIS — N20.0 KIDNEY STONE: Primary | ICD-10-CM

## 2024-06-04 DIAGNOSIS — N13.8 BENIGN PROSTATIC HYPERPLASIA WITH URINARY OBSTRUCTION: ICD-10-CM

## 2024-06-04 DIAGNOSIS — N20.0 KIDNEY STONES: ICD-10-CM

## 2024-06-04 DIAGNOSIS — N40.1 BENIGN PROSTATIC HYPERPLASIA WITH URINARY OBSTRUCTION: ICD-10-CM

## 2024-06-04 LAB
POC APPEARANCE, URINE: CLEAR
POC BILIRUBIN, URINE: NEGATIVE
POC BLOOD, URINE: ABNORMAL
POC COLOR, URINE: YELLOW
POC GLUCOSE, URINE: NEGATIVE MG/DL
POC KETONES, URINE: NEGATIVE MG/DL
POC LEUKOCYTES, URINE: NEGATIVE
POC NITRITE,URINE: NEGATIVE
POC PH, URINE: 6 PH
POC PROTEIN, URINE: NEGATIVE MG/DL
POC SPECIFIC GRAVITY, URINE: 1.02
POC UROBILINOGEN, URINE: 0.2 EU/DL

## 2024-06-04 PROCEDURE — 99214 OFFICE O/P EST MOD 30 MIN: CPT | Performed by: PHYSICIAN ASSISTANT

## 2024-06-04 PROCEDURE — 51798 US URINE CAPACITY MEASURE: CPT | Performed by: PHYSICIAN ASSISTANT

## 2024-06-04 PROCEDURE — 81003 URINALYSIS AUTO W/O SCOPE: CPT

## 2024-06-04 PROCEDURE — 87086 URINE CULTURE/COLONY COUNT: CPT

## 2024-06-04 PROCEDURE — 1036F TOBACCO NON-USER: CPT | Performed by: PHYSICIAN ASSISTANT

## 2024-06-04 PROCEDURE — 81002 URINALYSIS NONAUTO W/O SCOPE: CPT | Performed by: PHYSICIAN ASSISTANT

## 2024-06-04 ASSESSMENT — ENCOUNTER SYMPTOMS
ALLERGIC/IMMUNOLOGIC NEGATIVE: 1
MUSCULOSKELETAL NEGATIVE: 1
CONSTITUTIONAL NEGATIVE: 1
EYES NEGATIVE: 1
CARDIOVASCULAR NEGATIVE: 1
ENDOCRINE NEGATIVE: 1
GASTROINTESTINAL NEGATIVE: 1
PSYCHIATRIC NEGATIVE: 1
NEUROLOGICAL NEGATIVE: 1
RESPIRATORY NEGATIVE: 1
HEMATOLOGIC/LYMPHATIC NEGATIVE: 1

## 2024-06-04 NOTE — PROGRESS NOTES
Subjective   Patient ID: Elliot Mares is a 54 y.o. male who presents for No chief complaint on file..  HPI  Patient is a 53 yo male with hx of melanoma treated with immunotherapy, recurrent UTIs, mild stricture, hx of kidney stones.    Patient doing well symptomatically. He denies bothersome urinary difficulties. He denies flank pain nausea or vomiting. He denies hematuria or dysuria.     UA trace of blood  PVR 0    Patient had a CT of the abd and pelvis for melanoma surveillance. It was negative for renal stones.   Study Result    Narrative   Interpreted By:  SEBAS HICKS MD  MRN: [id number]  Patient Name: ELLIOT MARES     STUDY:  CT CHEST ABDOMEN PELVIS W IV CONTRAST;  9/18/2023 1:14 pm     INDICATION:  IIIa melanoma of the face s/p resection and immunotherapy; currently  on surveillance  C43.39: Malignant melanoma of skin of forehead.     COMPARISON:  03/20/2023, oldest chest CT 08/06/2021     ACCESSION NUMBER(S):  [id number]     ORDERING CLINICIAN:  FACUNDO JACKSON     TECHNIQUE:  CT of the chest, abdomen, and pelvis was performed.  Contiguous axial images were obtained at 3 mm slice thickness through  the chest, abdomen and pelvis. Coronal and sagittal reconstructions  at 3 mm slice thickness were performed.  90 mL Omnipaque 350, water     FINDINGS:  CHEST:     LUNG/PLEURA/LARGE AIRWAYS:  There is no infiltrate or pleural fluid.  There is a 3 mm smooth noncalcified nodule in the right lower lobe  laterally on axial image 159/306, stable compared to oldest chest CT  from 08/06/2021.  There is a 4 mm smooth noncalcified nodule in the left lower lobe  laterally on image 222/306, stable compared due oldest chest CT from  08/06/2021.     VESSELS:  The thoracic vessels are unremarkable.     HEART:  The heart is unremarkable.     MEDIASTINUM AND GABBY:  There is no hilar or mediastinal adenopathy.     CHEST WALL AND LOWER NECK:  The chest wall is unremarkable. There is no abnormality of the lower  neck.      ABDOMEN:     LIVER:  There is no hepatic mass.     BILE DUCTS:  There is no intrahepatic, common hepatic or common bile ductal  dilatation.     GALLBLADDER:  Status post cholecystectomy.     PANCREAS:  There is no abnormality of the pancreas.     SPLEEN:  The spleen is unremarkable. There is no splenic mass. There is no  splenomegaly     ADRENAL GLANDS:  The adrenal glands are unremarkable.     KIDNEYS AND URETERS:.  The kidneys function symmetrically.  There is no renal mass.  There is no intrarenal calculus or hydronephrosis.        PELVIS:     BLADDER:  The bladder is unremarkable.     REPRODUCTIVE ORGANS:  There is no abnormality of the reproductive organs.     BOWEL:  There is no bowel wall thickening, dilatation or obstruction.  There is a moderate amount of stool throughout the colon.     VESSELS:  The abdominal and pelvic vessels are unremarkable.     PERITONEUM/RETROPERITONEUM/LYMPH NODES:  There is no retroperitoneal or pelvic adenopathy.  There is no  ascites.     ABDOMINAL WALL:  The abdominal wall is unremarkable.     BONES AND SOFT TISSUES:  There is no acute osseous finding.     There is no soft tissue abnormality.      Impression   CHEST:  1.  Stage III A melanoma of the face, status post resection and  immunotherapy. Surveillance.  2. Stable bilateral subcentimeter nodules compared to oldest chest CT  from 08/06/2021     ABDOMEN AND PELVIS:  1.  Stage III A melanoma of the face, status post resection and  immunotherapy; surveillance.  2. No metastatic disease to the abdomen and pelvis.  3. Status post cholecystectomy.  4. Constipation.     Review of Systems   Constitutional: Negative.    HENT: Negative.     Eyes: Negative.    Respiratory: Negative.     Cardiovascular: Negative.    Gastrointestinal: Negative.    Endocrine: Negative.    Genitourinary: Negative.    Musculoskeletal: Negative.    Skin: Negative.    Allergic/Immunologic: Negative.    Neurological: Negative.    Hematological: Negative.     Psychiatric/Behavioral: Negative.         Objective   Physical Exam  Constitutional:       General: He is not in acute distress.     Appearance: Normal appearance.   HENT:      Head: Normocephalic and atraumatic.      Nose: Nose normal.      Mouth/Throat:      Mouth: Mucous membranes are moist.   Cardiovascular:      Rate and Rhythm: Normal rate.   Pulmonary:      Effort: Pulmonary effort is normal.   Abdominal:      General: Abdomen is flat.      Palpations: Abdomen is soft.   Genitourinary:     Penis: Normal.       Testes: Normal.   Musculoskeletal:      Cervical back: Normal range of motion.   Neurological:      Mental Status: He is alert.         Assessment/Plan   Problem List Items Addressed This Visit             ICD-10-CM    UTI (urinary tract infection) N39.0    Kidney stones N20.0     Other Visit Diagnoses         Codes    Kidney stone    -  Primary N20.0    Relevant Orders    Urinalysis with Reflex Microscopic    Urine Culture    PSA    Urine Culture    Urinalysis with Reflex Microscopic    Benign prostatic hyperplasia with urinary obstruction     N40.1, N13.8          Renal stones  I advised increasing fluid intake to 2.5 L of water a day and mroe if working outside. I advised adding tenisha to his water.     I reviewed and discussed last CT scan was negative for renal stones.     Recurrent UTIs  Urine sent for microscopy and culture.  Standing urine orders placed in the lab  I advised increasing hydration.     Patient due for PSA. Order given.        Mary Kearney PA-C 06/04/24 8:45 AM

## 2024-06-05 LAB
APPEARANCE UR: CLEAR
BILIRUB UR STRIP.AUTO-MCNC: NEGATIVE MG/DL
COLOR UR: NORMAL
GLUCOSE UR STRIP.AUTO-MCNC: NORMAL MG/DL
KETONES UR STRIP.AUTO-MCNC: NEGATIVE MG/DL
LEUKOCYTE ESTERASE UR QL STRIP.AUTO: NEGATIVE
NITRITE UR QL STRIP.AUTO: NEGATIVE
PH UR STRIP.AUTO: 6 [PH]
PROT UR STRIP.AUTO-MCNC: NEGATIVE MG/DL
RBC # UR STRIP.AUTO: NEGATIVE /UL
SP GR UR STRIP.AUTO: 1.02
UROBILINOGEN UR STRIP.AUTO-MCNC: NORMAL MG/DL

## 2024-06-06 LAB — BACTERIA UR CULT: NORMAL

## 2024-06-10 ENCOUNTER — APPOINTMENT (OUTPATIENT)
Dept: DERMATOLOGY | Facility: CLINIC | Age: 54
End: 2024-06-10
Payer: COMMERCIAL

## 2024-06-10 ENCOUNTER — OFFICE VISIT (OUTPATIENT)
Dept: DERMATOLOGY | Facility: CLINIC | Age: 54
End: 2024-06-10
Payer: COMMERCIAL

## 2024-06-10 DIAGNOSIS — D48.5 NEOPLASM OF UNCERTAIN BEHAVIOR OF SKIN: ICD-10-CM

## 2024-06-10 DIAGNOSIS — L57.8 ACTINIC SKIN DAMAGE: ICD-10-CM

## 2024-06-10 DIAGNOSIS — D18.01 HEMANGIOMA OF SKIN: ICD-10-CM

## 2024-06-10 DIAGNOSIS — D22.9 MULTIPLE BENIGN NEVI: ICD-10-CM

## 2024-06-10 DIAGNOSIS — Z85.828 PERSONAL HISTORY OF SKIN CANCER: ICD-10-CM

## 2024-06-10 DIAGNOSIS — L81.4 LENTIGO: ICD-10-CM

## 2024-06-10 DIAGNOSIS — Z12.83 SCREENING EXAM FOR SKIN CANCER: Primary | ICD-10-CM

## 2024-06-10 DIAGNOSIS — L82.1 SEBORRHEIC KERATOSIS: ICD-10-CM

## 2024-06-10 ASSESSMENT — DERMATOLOGY QUALITY OF LIFE (QOL) ASSESSMENT
DATE THE QUALITY-OF-LIFE ASSESSMENT WAS COMPLETED: 67001
ARE THERE EXCLUSIONS OR EXCEPTIONS FOR THE QUALITY OF LIFE ASSESSMENT: NO
RATE HOW BOTHERED YOU ARE BY EFFECTS OF YOUR SKIN PROBLEMS ON YOUR ACTIVITIES (EG, GOING OUT, ACCOMPLISHING WHAT YOU WANT, WORK ACTIVITIES OR YOUR RELATIONSHIPS WITH OTHERS): 0 - NEVER BOTHERED
WHAT SINGLE SKIN CONDITION LISTED BELOW IS THE PATIENT ANSWERING THE QUALITY-OF-LIFE ASSESSMENT QUESTIONS ABOUT: NONE OF THE ABOVE
RATE HOW BOTHERED YOU ARE BY SYMPTOMS OF YOUR SKIN PROBLEM (EG, ITCHING, STINGING BURNING, HURTING OR SKIN IRRITATION): 0 - NEVER BOTHERED
RATE HOW EMOTIONALLY BOTHERED YOU ARE BY YOUR SKIN PROBLEM (FOR EXAMPLE, WORRY, EMBARRASSMENT, FRUSTRATION): 0 - NEVER BOTHERED

## 2024-06-10 ASSESSMENT — DERMATOLOGY PATIENT ASSESSMENT
DO YOU HAVE ANY NEW OR CHANGING LESIONS: NO
HAVE YOU HAD OR DO YOU HAVE VASCULAR DISEASE: NO
ARE YOU AN ORGAN TRANSPLANT RECIPIENT: NO
DO YOU USE SUNSCREEN: OCCASIONALLY
HAVE YOU HAD OR DO YOU HAVE A STAPH INFECTION: NO
DO YOU USE A TANNING BED: YES, PREVIOUSLY

## 2024-06-10 ASSESSMENT — PATIENT GLOBAL ASSESSMENT (PGA): PATIENT GLOBAL ASSESSMENT: PATIENT GLOBAL ASSESSMENT:  1 - CLEAR

## 2024-06-10 ASSESSMENT — ITCH NUMERIC RATING SCALE: HOW SEVERE IS YOUR ITCHING?: 0

## 2024-06-11 NOTE — PROGRESS NOTES
Subjective     Elliot Donato is a 54 y.o. male who presents for the following: Skin Check (FBSE, HX of melanoma in 2021 on nose).     Review of Systems:  No other skin or systemic complaints other than what is documented elsewhere in the note.    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    A full examination was performed including scalp, head, eyes, ears, nose, lips, neck, chest, axillae, abdomen, back, buttocks, bilateral upper extremities, bilateral lower extremities, hands, feet, fingers, toes, fingernails, and toenails. All findings within normal limits unless otherwise noted below.    Assessment/Plan   1. Screening exam for skin cancer      Full body skin exam  - The ugly duckling sign was discussed. Monitor for any skin lesions that are different in color, shape, or size than others on body  -Sun protection was discussed. Recommend SPF 30+, hats with brims, sun protective clothing, and avoiding sun exposure between 10 AM and 2 PM whenever possible  -Recommend regular skin exams or sooner if new or changing lesions       2. 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 Patient verbalizes understanding    3. Neoplasm of uncertain behavior of skin (2)  Mid Back  Involving the mid lower back there is a 6 mm asymmetric brown  pigmented macule with scalloped border and atypical pigment network                  Lesion biopsy  Type of biopsy: tangential    Informed consent: discussed and consent obtained    Timeout: patient name, date of birth, surgical site, and procedure verified    Procedure prep:  Patient was prepped and draped  Anesthesia: the lesion was anesthetized in a standard fashion    Anesthetic:  1% lidocaine w/ epinephrine 1-100,000 local infiltration  Instrument used: DermaBlade    Hemostasis achieved with: aluminum chloride    Outcome: patient tolerated procedure well    Post-procedure details: sterile dressing applied and wound care instructions given    Dressing type: petrolatum and bandage      Specimen 1 - Dermatopathology- DERM LAB  Differential Diagnosis: Suspect dysplastic nevus, less likely to be melanoma  Check Margins Yes/No?:    Comments:  Personal history of melanoma and family history of melanoma  Dermpath Lab: Routine Histopathology (formalin-fixed tissue)    Gluteal Crease  Involving the perianal skin there is a velvety, tag-like papule    4. Seborrheic keratosis  Left Upper Back  Stuck on, waxy macule(s)/papule(s)/plaque(s) with comedo-like openings and milia like cysts    -Discussed the nature of the diagnosis  -Reassurance, recommend continued observation    5. Multiple benign nevi (2)  Left Breast, Right Upper Back  Brown and tan macules and papules with reassuring findings on dermoscopy    -These lesions have benign, reassuring patterns on dermoscopy  -Recommend continued self observation, and to contact the office if any changes in nevi are noticed    6. Actinic skin damage  Neck - Posterior  Background of photodamage with hyper- and hypo-pigmented macules on the skin    7. Hemangioma of skin  Left Lower Back  Cherry red papules    -Discussed the nature of the diagnosis  -Reassurance, recommend continued observation    8. Lentigo  Head - Anterior (Face)  Tan macules    -Benign appearing on  exam  -Reassurance, recommend observation

## 2024-06-13 NOTE — ADDENDUM NOTE
Addended by: BRIE WILSON on: 6/13/2024 10:48 AM     Modules accepted: Orders     Future appt:  None  Last Appointment:  1/19/2018; No f/u recommended    Cholesterol, Total (mg/dL)   Date Value   09/30/2017 183   ----------  HDL Cholesterol (mg/dL)   Date Value   09/30/2017 36 (L)   ----------  LDL Cholesterol (mg/dL)   Date Value   09/

## 2024-06-17 LAB
LAB AP ASR DISCLAIMER: NORMAL
LABORATORY COMMENT REPORT: NORMAL
PATH REPORT.FINAL DX SPEC: NORMAL
PATH REPORT.GROSS SPEC: NORMAL
PATH REPORT.MICROSCOPIC SPEC OTHER STN: NORMAL
PATH REPORT.RELEVANT HX SPEC: NORMAL
PATH REPORT.TOTAL CANCER: NORMAL

## 2024-09-03 ENCOUNTER — OFFICE VISIT (OUTPATIENT)
Dept: OPHTHALMOLOGY | Facility: CLINIC | Age: 54
End: 2024-09-03
Payer: COMMERCIAL

## 2024-09-03 DIAGNOSIS — H02.889 MEIBOMIAN GLAND DYSFUNCTION: Primary | ICD-10-CM

## 2024-09-03 DIAGNOSIS — H52.7 REFRACTIVE ERROR: ICD-10-CM

## 2024-09-03 DIAGNOSIS — H25.13 AGE-RELATED NUCLEAR CATARACT OF BOTH EYES: ICD-10-CM

## 2024-09-03 PROCEDURE — 99213 OFFICE O/P EST LOW 20 MIN: CPT | Performed by: OPHTHALMOLOGY

## 2024-09-03 ASSESSMENT — ENCOUNTER SYMPTOMS
GASTROINTESTINAL NEGATIVE: 0
CARDIOVASCULAR NEGATIVE: 0
ALLERGIC/IMMUNOLOGIC NEGATIVE: 0
RESPIRATORY NEGATIVE: 0
ENDOCRINE NEGATIVE: 0
CONSTITUTIONAL NEGATIVE: 0
NEUROLOGICAL NEGATIVE: 0
EYES NEGATIVE: 0
PSYCHIATRIC NEGATIVE: 0
HEMATOLOGIC/LYMPHATIC NEGATIVE: 0
MUSCULOSKELETAL NEGATIVE: 0

## 2024-09-03 ASSESSMENT — VISUAL ACUITY
OD_SC: 20/20
OD_SC+: -1
OS_SC+: -1
OS_SC: 20/20
METHOD: SNELLEN - SINGLE

## 2024-09-03 ASSESSMENT — PATIENT HEALTH QUESTIONNAIRE - PHQ9
1. LITTLE INTEREST OR PLEASURE IN DOING THINGS: NOT AT ALL
SUM OF ALL RESPONSES TO PHQ9 QUESTIONS 1 AND 2: 0
2. FEELING DOWN, DEPRESSED OR HOPELESS: NOT AT ALL

## 2024-09-03 ASSESSMENT — KERATOMETRY
OD_AXISANGLE_DEGREES: 30
OD_AXISANGLE2_DEGREES: 120
OS_AXISANGLE2_DEGREES: 160
METHOD_AUTO_MANUAL: AUTOMATED
OS_K1POWER_DIOPTERS: 40.25
OD_K1POWER_DIOPTERS: 40.25
OS_AXISANGLE_DEGREES: 70
OS_K2POWER_DIOPTERS: 41.25
OD_K2POWER_DIOPTERS: 40.75

## 2024-09-03 ASSESSMENT — EXTERNAL EXAM - LEFT EYE: OS_EXAM: NORMAL

## 2024-09-03 ASSESSMENT — TONOMETRY
IOP_METHOD: GOLDMANN APPLANATION
OD_IOP_MMHG: 14
OS_IOP_MMHG: 16

## 2024-09-03 ASSESSMENT — PAIN SCALES - GENERAL: PAINLEVEL: 0-NO PAIN

## 2024-09-03 ASSESSMENT — REFRACTION_MANIFEST
OS_SPHERE: +1.50
OS_CYLINDER: -0.50
OD_SPHERE: +1.00
OS_AXIS: 095
OD_AXIS: 095
METHOD_AUTOREFRACTION: 1
OD_CYLINDER: -0.50

## 2024-09-03 ASSESSMENT — CUP TO DISC RATIO
OD_RATIO: 0.25
OS_RATIO: 0.25

## 2024-09-03 ASSESSMENT — EXTERNAL EXAM - RIGHT EYE: OD_EXAM: NORMAL

## 2024-09-03 NOTE — ASSESSMENT & PLAN NOTE
Still with some residual inspissation of glands but no significant erythema. Advised on warm compress and lid scrubs. Can trial some lubrication as needed.

## 2024-09-03 NOTE — PATIENT INSTRUCTIONS
Thank you so much for choosing me to provide your care today!    If you were dilated your vision may remain blurry   or light sensitive for several hours.    The nature of eye and vision problems can require frequent follow up, please make every effort to adhere to any future appointments.    If you have any issues, questions, or concerns,   please do not hesitate to reach out.    If you receive a survey in regards to your care today, please mention any exceptional care my office staff and/or technicians provided.    You can reach our office at this number:    [phone]    Please consider signing up for and utilizing [employer]!  This is the best way to directly reach me or other  providers    Eyelid Care    [x]Warm compress 1 time(s) daily to both eye(s)  10-15 minutes on closed eyes  To make a reusable compress:  Place 1 cup dry uncooked rice in a clean sock  Tie a knot  Microwave for 10-20 seconds, may add more time as needed  Compress should be warm but not hot   Replace if soiled or develops bad odor  May follow compress with gentle eyelid massage    [x]Eyelid scrub 1 time(s) daily to both eye(s)  Scrub technique  Apply small amount of baby shampoo/mild soap and water to fingertips  Work into a lather and gently scrub closed lids for 10-15 seconds  Keeping eyes closed, rinse with fresh water

## 2024-09-03 NOTE — PROGRESS NOTES
Assessment/Plan   Problem List Items Addressed This Visit       Age-related nuclear cataract of both eyes     Non significant cataract noted on exam. Will plan to continue to monitor with serial exam.            Meibomian gland dysfunction - Primary     Still with some residual inspissation of glands but no significant erythema. Advised on warm compress and lid scrubs. Can trial some lubrication as needed.         Refractive error     Hold on RX              Provided reassurance regarding above diagnoses and care received in the office visit today. Discussed outcomes and options along with the importance of treatment compliance. Understands the importance of any follow up visits. Patient instructed to call/communicate with our office if any new issues, questions, or concerns.     Will plan to see back in 1 year full or sooner PRN

## 2024-09-05 ENCOUNTER — LAB (OUTPATIENT)
Dept: LAB | Facility: LAB | Age: 54
End: 2024-09-05
Payer: COMMERCIAL

## 2024-09-05 DIAGNOSIS — N20.0 KIDNEY STONE: ICD-10-CM

## 2024-09-05 LAB — PSA SERPL-MCNC: 0.6 NG/ML

## 2024-09-05 PROCEDURE — 36415 COLL VENOUS BLD VENIPUNCTURE: CPT

## 2024-09-05 PROCEDURE — 84153 ASSAY OF PSA TOTAL: CPT

## 2024-09-18 DIAGNOSIS — C77.9 MELANOMA METASTATIC TO LYMPH NODE (MULTI): Primary | ICD-10-CM

## 2024-09-23 ENCOUNTER — LAB (OUTPATIENT)
Dept: LAB | Facility: CLINIC | Age: 54
End: 2024-09-23
Payer: COMMERCIAL

## 2024-09-23 ENCOUNTER — HOSPITAL ENCOUNTER (OUTPATIENT)
Dept: RADIOLOGY | Facility: CLINIC | Age: 54
Discharge: HOME | End: 2024-09-23
Payer: COMMERCIAL

## 2024-09-23 DIAGNOSIS — C43.39 MALIGNANT MELANOMA OF OTHER PARTS OF FACE (MULTI): ICD-10-CM

## 2024-09-23 DIAGNOSIS — C77.9 MELANOMA METASTATIC TO LYMPH NODE (MULTI): ICD-10-CM

## 2024-09-23 LAB
BASOPHILS # BLD AUTO: 0.04 X10*3/UL (ref 0–0.1)
BASOPHILS NFR BLD AUTO: 0.5 %
CREAT SERPL-MCNC: 1.2 MG/DL (ref 0.6–1.3)
EOSINOPHIL # BLD AUTO: 0 X10*3/UL (ref 0–0.7)
EOSINOPHIL NFR BLD AUTO: 0 %
ERYTHROCYTE [DISTWIDTH] IN BLOOD BY AUTOMATED COUNT: 13.2 % (ref 11.5–14.5)
GFR SERPL CREATININE-BSD FRML MDRD: 71 ML/MIN/1.73M*2
HCT VFR BLD AUTO: 41.7 % (ref 41–52)
HGB BLD-MCNC: 14.4 G/DL (ref 13.5–17.5)
IMM GRANULOCYTES # BLD AUTO: 0.03 X10*3/UL (ref 0–0.7)
IMM GRANULOCYTES NFR BLD AUTO: 0.4 % (ref 0–0.9)
LYMPHOCYTES # BLD AUTO: 1.89 X10*3/UL (ref 1.2–4.8)
LYMPHOCYTES NFR BLD AUTO: 25.3 %
MCH RBC QN AUTO: 29.4 PG (ref 26–34)
MCHC RBC AUTO-ENTMCNC: 34.5 G/DL (ref 32–36)
MCV RBC AUTO: 85 FL (ref 80–100)
MONOCYTES # BLD AUTO: 0.55 X10*3/UL (ref 0.1–1)
MONOCYTES NFR BLD AUTO: 7.4 %
NEUTROPHILS # BLD AUTO: 4.96 X10*3/UL (ref 1.2–7.7)
NEUTROPHILS NFR BLD AUTO: 66.4 %
NRBC BLD-RTO: NORMAL /100{WBCS}
PLATELET # BLD AUTO: 224 X10*3/UL (ref 150–450)
RBC # BLD AUTO: 4.89 X10*6/UL (ref 4.5–5.9)
WBC # BLD AUTO: 7.5 X10*3/UL (ref 4.4–11.3)

## 2024-09-23 PROCEDURE — 71260 CT THORAX DX C+: CPT | Performed by: RADIOLOGY

## 2024-09-23 PROCEDURE — 70491 CT SOFT TISSUE NECK W/DYE: CPT | Performed by: RADIOLOGY

## 2024-09-23 PROCEDURE — 70491 CT SOFT TISSUE NECK W/DYE: CPT

## 2024-09-23 PROCEDURE — 82565 ASSAY OF CREATININE: CPT

## 2024-09-23 PROCEDURE — 74177 CT ABD & PELVIS W/CONTRAST: CPT | Performed by: RADIOLOGY

## 2024-09-23 PROCEDURE — 74177 CT ABD & PELVIS W/CONTRAST: CPT

## 2024-09-23 PROCEDURE — 82565 ASSAY OF CREATININE: CPT | Performed by: NURSE PRACTITIONER

## 2024-09-23 PROCEDURE — 83615 LACTATE (LD) (LDH) ENZYME: CPT

## 2024-09-23 PROCEDURE — 85025 COMPLETE CBC W/AUTO DIFF WBC: CPT

## 2024-09-23 PROCEDURE — 2550000001 HC RX 255 CONTRASTS: Performed by: NURSE PRACTITIONER

## 2024-09-24 LAB
ALBUMIN SERPL BCP-MCNC: 4.5 G/DL (ref 3.4–5)
ALP SERPL-CCNC: 61 U/L (ref 33–120)
ALT SERPL W P-5'-P-CCNC: 25 U/L (ref 10–52)
ANION GAP SERPL CALC-SCNC: 13 MMOL/L (ref 10–20)
AST SERPL W P-5'-P-CCNC: 18 U/L (ref 9–39)
BILIRUB SERPL-MCNC: 1 MG/DL (ref 0–1.2)
BUN SERPL-MCNC: 15 MG/DL (ref 6–23)
CALCIUM SERPL-MCNC: 9.3 MG/DL (ref 8.6–10.6)
CHLORIDE SERPL-SCNC: 104 MMOL/L (ref 98–107)
CO2 SERPL-SCNC: 27 MMOL/L (ref 21–32)
CREAT SERPL-MCNC: 0.98 MG/DL (ref 0.5–1.3)
EGFRCR SERPLBLD CKD-EPI 2021: >90 ML/MIN/1.73M*2
GLUCOSE SERPL-MCNC: 86 MG/DL (ref 74–99)
LDH SERPL L TO P-CCNC: 168 U/L (ref 84–246)
POTASSIUM SERPL-SCNC: 4.3 MMOL/L (ref 3.5–5.3)
PROT SERPL-MCNC: 6.8 G/DL (ref 6.4–8.2)
SODIUM SERPL-SCNC: 140 MMOL/L (ref 136–145)

## 2024-09-25 ENCOUNTER — OFFICE VISIT (OUTPATIENT)
Dept: HEMATOLOGY/ONCOLOGY | Facility: CLINIC | Age: 54
End: 2024-09-25
Payer: COMMERCIAL

## 2024-09-25 VITALS
OXYGEN SATURATION: 96 % | RESPIRATION RATE: 18 BRPM | TEMPERATURE: 97.3 F | BODY MASS INDEX: 28.55 KG/M2 | SYSTOLIC BLOOD PRESSURE: 130 MMHG | DIASTOLIC BLOOD PRESSURE: 81 MMHG | WEIGHT: 199 LBS | HEART RATE: 69 BPM

## 2024-09-25 DIAGNOSIS — C77.9 MELANOMA METASTATIC TO LYMPH NODE (MULTI): Primary | ICD-10-CM

## 2024-09-25 DIAGNOSIS — K86.2 PANCREATIC CYST (HHS-HCC): ICD-10-CM

## 2024-09-25 PROCEDURE — 99215 OFFICE O/P EST HI 40 MIN: CPT | Performed by: NURSE PRACTITIONER

## 2024-09-25 PROCEDURE — 1036F TOBACCO NON-USER: CPT | Performed by: NURSE PRACTITIONER

## 2024-09-25 ASSESSMENT — ENCOUNTER SYMPTOMS
GASTROINTESTINAL NEGATIVE: 1
HEMATOLOGIC/LYMPHATIC NEGATIVE: 1
NEUROLOGICAL NEGATIVE: 1
RESPIRATORY NEGATIVE: 1
CARDIOVASCULAR NEGATIVE: 1
ENDOCRINE NEGATIVE: 1
MUSCULOSKELETAL NEGATIVE: 1
PSYCHIATRIC NEGATIVE: 1
CONSTITUTIONAL NEGATIVE: 1
EYES NEGATIVE: 1

## 2024-09-25 ASSESSMENT — PAIN SCALES - GENERAL: PAINLEVEL: 0-NO PAIN

## 2024-09-25 NOTE — PROGRESS NOTES
".Patient ID: Elliot Donato is a 54 y.o. male.  Referring Physician: No referring provider defined for this encounter.  Primary Care Provider: Julius Walker MD     Oncology follow up     HPI  Treatment Synopsis:   Mr. Donato is referred by Dr. Brennon Patrick for management of stage IIIA cutaneous melanoma.     Mr. Donato presented to a dermatologist outside the hospital in April 2021 with a mole on the right side of his nose that had been present for almost a year. Biopsy of the mole showed  melanoma (BW- 1.1mm, non ulcerated, nodular type). He met with Dr. Patrick, and underwent WLE on 05/12/2021 and Mohs surgery on 06/01/2021 with Dr. Mcgarry. SLNB positive- 1/3 node positive. Final stage: Stage IIIA ([id number]). Discussed with patient regarding  adjuvant immunotherapy for stage IIIA melanoma with micrometastatic disease. He agreed to starting Keytruda. Completed 18 cycles on 09/02/2022 and is currently on surveillance.     He called in in 02/2024 complaining of headaches which were terrible. They were waking him up from his sleep. Hence, we proceeded to get a stat MRI brain. This did not show any brain mets, but picked up a parotid lesion. We spoke to Dr. Patrick and biopsy 04/01/2024 negative for melanoma.     Subjective   Please refer to \"Notes/Cancer History\" above for complete History of present illness.     Mr Elliot Donato     - Presents to clinic alone.   - Feels well.   - No new issues or concerns.  - Continues to follow with dermatology for skin checks.      Review of Systems:   Review of Systems   Constitutional: Negative.    HENT:  Negative.     Eyes: Negative.    Respiratory: Negative.     Cardiovascular: Negative.    Gastrointestinal: Negative.    Endocrine: Negative.    Genitourinary: Negative.     Musculoskeletal: Negative.    Skin: Negative.    Neurological: Negative.    Hematological: Negative.    Psychiatric/Behavioral: Negative.           MEDICAL HISTORY  Past Medical History:   Diagnosis Date    " Age-related nuclear cataract, bilateral     Malignant melanoma of right lower eyelid including canthus (Multi)     Optic neuritis     Personal history of other diseases of the musculoskeletal system and connective tissue     History of arthritis    Personal history of other specified conditions 04/22/2021    History of dysuria    Refractive error        FAMILY HISTORY  Family History   Problem Relation Name Age of Onset    Diabetes Mother         TOBACCO HISTORY  Tobacco Use: Low Risk  (9/3/2024)    Patient History     Smoking Tobacco Use: Never     Smokeless Tobacco Use: Never     Passive Exposure: Not on file       SOCIAL HISTORY  Social Connections: Not on file   , lives with his wife. Works full time as a .      Outpatient Medication Profile:  Current Outpatient Medications on File Prior to Visit   Medication Sig Dispense Refill    aspirin 81 mg EC tablet Take by mouth.      multivit with min-folic acid 0.4 mg tablet once every 24 hours.       No current facility-administered medications on file prior to visit.         Performance Status:  Asymptomatic     Vitals and Measurements:   Vitals:    09/25/24 1117   BP: 130/81   Pulse: 69   Resp: 18   Temp: 36.3 °C (97.3 °F)   TempSrc: Core   SpO2: 96%   Weight: 90.3 kg (199 lb)   PainSc: 0-No pain        Physical Exam:   Physical Exam  Constitutional:       Appearance: Normal appearance.   HENT:      Head: Normocephalic and atraumatic.      Comments: Well healed incision on the face without nodules     Nose: Nose normal.      Mouth/Throat:      Mouth: Mucous membranes are moist.      Pharynx: Oropharynx is clear.   Eyes:      Conjunctiva/sclera: Conjunctivae normal.   Cardiovascular:      Rate and Rhythm: Normal rate and regular rhythm.      Pulses: Normal pulses.      Heart sounds: Normal heart sounds.   Pulmonary:      Effort: Pulmonary effort is normal.      Breath sounds: Normal breath sounds.   Abdominal:      General: Bowel sounds are normal.       Palpations: Abdomen is soft.   Musculoskeletal:         General: Normal range of motion.      Cervical back: Neck supple.   Skin:     General: Skin is warm and dry.   Neurological:      General: No focal deficit present.      Mental Status: He is alert and oriented to person, place, and time.   Psychiatric:         Mood and Affect: Mood normal.         Behavior: Behavior normal.        Lab Results:  I have reviewed these laboratory results:     Lab on 09/23/2024   Component Date Value Ref Range Status    WBC 09/23/2024 7.5  4.4 - 11.3 x10*3/uL Final    nRBC 09/23/2024    Final    RBC 09/23/2024 4.89  4.50 - 5.90 x10*6/uL Final    Hemoglobin 09/23/2024 14.4  13.5 - 17.5 g/dL Final    Hematocrit 09/23/2024 41.7  41.0 - 52.0 % Final    MCV 09/23/2024 85  80 - 100 fL Final    MCH 09/23/2024 29.4  26.0 - 34.0 pg Final    MCHC 09/23/2024 34.5  32.0 - 36.0 g/dL Final    RDW 09/23/2024 13.2  11.5 - 14.5 % Final    Platelets 09/23/2024 224  150 - 450 x10*3/uL Final    Neutrophils % 09/23/2024 66.4  40.0 - 80.0 % Final    Immature Granulocytes %, Automated 09/23/2024 0.4  0.0 - 0.9 % Final    Lymphocytes % 09/23/2024 25.3  13.0 - 44.0 % Final    Monocytes % 09/23/2024 7.4  2.0 - 10.0 % Final    Eosinophils % 09/23/2024 0.0  0.0 - 6.0 % Final    Basophils % 09/23/2024 0.5  0.0 - 2.0 % Final    Neutrophils Absolute 09/23/2024 4.96  1.20 - 7.70 x10*3/uL Final    Immature Granulocytes Absolute, Au* 09/23/2024 0.03  0.00 - 0.70 x10*3/uL Final    Lymphocytes Absolute 09/23/2024 1.89  1.20 - 4.80 x10*3/uL Final    Monocytes Absolute 09/23/2024 0.55  0.10 - 1.00 x10*3/uL Final    Eosinophils Absolute 09/23/2024 0.00  0.00 - 0.70 x10*3/uL Final    Basophils Absolute 09/23/2024 0.04  0.00 - 0.10 x10*3/uL Final    Glucose 09/23/2024 86  74 - 99 mg/dL Final    Sodium 09/23/2024 140  136 - 145 mmol/L Final    Potassium 09/23/2024 4.3  3.5 - 5.3 mmol/L Final    Chloride 09/23/2024 104  98 - 107 mmol/L Final    Bicarbonate 09/23/2024 27  21  - 32 mmol/L Final    Anion Gap 09/23/2024 13  10 - 20 mmol/L Final    Urea Nitrogen 09/23/2024 15  6 - 23 mg/dL Final    Creatinine 09/23/2024 0.98  0.50 - 1.30 mg/dL Final    eGFR 09/23/2024 >90  >60 mL/min/1.73m*2 Final    Calcium 09/23/2024 9.3  8.6 - 10.6 mg/dL Final    Albumin 09/23/2024 4.5  3.4 - 5.0 g/dL Final    Alkaline Phosphatase 09/23/2024 61  33 - 120 U/L Final    Total Protein 09/23/2024 6.8  6.4 - 8.2 g/dL Final    AST 09/23/2024 18  9 - 39 U/L Final    Bilirubin, Total 09/23/2024 1.0  0.0 - 1.2 mg/dL Final    ALT 09/23/2024 25  10 - 52 U/L Final    LDH 09/23/2024 168  84 - 246 U/L Final   Orders Only on 09/18/2024   Component Date Value Ref Range Status    POCT Creatinine 09/23/2024 1.20  0.60 - 1.30 mg/dL Final    POCT eGFR 09/23/2024 71  >=60 mL/min/1.73m*2 Final   Lab on 09/05/2024   Component Date Value Ref Range Status    Prostate Specific AG 09/05/2024 0.60  <=4.10 ng/mL Final         Radiology Result:  I have reviewed the latest Imaging in PACS and the findings are noted in this note. I discussed the results of the latest imaging with the patient. All previous imaging were reviewed at the time it was completed. Full records are available in the EMR for review as well.     CT chest abdomen pelvis w IV contrast    Result Date: 9/24/2024  Impression: Melanoma, restaging scan 1. When compared to prior CT from 09/18/2023, there is no significant interval change, with no definite evidence of recurrence in the chest, abdomen or pelvis. 2. Stable pulmonary nodules measuring up to 7 mm, which bears attention on follow-up imaging. 3. Mild prostatomegaly. Enlargement of the bilateral seminal vesicles is seen, which may be secondary to prostatomegaly versus urinary tract infection. Correlation with patient's symptomology/urinalysis recommended. 4. Hepatic steatosis, please correlate with liver function tests.   I personally reviewed the images/study and I agree with the findings as stated by Kayla  MD Cristino. This study was interpreted at [place], [place].   MACRO: None   Signed by: Mehdi Barros 9/24/2024 11:24 PM Dictation workstation:   [id number]    CT soft tissue neck w IV contrast    Result Date: 9/24/2024  Impression: No cervical lymphadenopathy by size criteria.   I personally reviewed the images/study and I agree with the findings as stated by Prince Rogers MD (Radiology Resident). This study was interpreted at [place], [place].   MACRO: None   Signed by: Gia Richard 9/24/2024 8:31 AM Dictation workstation:   [id number]    STUDY:  MR BRAIN W AND WO IV CONTRAST;  2/8/2024 9:35 am    IMPRESSION:  1. No evidence of intracranial metastatic disease.  2. There is a 1 cm ovoid T2 hyperintense nodule within the  superficial parotid gland on the right not visualized on the prior CT  neck 09/18/2023. Attention on follow-up exams.     Pathology Results:  I have reviewed the full pathology report recorded in the EMR. The pertinent portions indicating diagnosis are listed here in the note. for details please refer to the full report recorded in the EMR.    Dermatopathology [May 3 2021 3:01PM] ([id number])    Specimens: 3 SLIDES, DERMPATH DIAGNOSTICS [facility] [place], #[id number] [A]  (BX:     Accession #: [id number]   Pathologist: EDUARDO MATUTE MD   Date of Procedure: 4/30/2021   Date Received: 4/30/2021   Submitting Physician: RAIZA ESTRELLA MD   Location: [facility]   Copy To/Referring/Attending:   NEIDA SANCHEZ DO     FINAL DIAGNOSIS   3 SLIDES, DERMPATH DIAGNOSTICS [facility] [place], #[id number] [A] (BX:   04/12/2021)     A. SKIN, NASAL DORSUM, SHAVE BIOPSY:   MALIGNANT MELANOMA, BRESLOW THICKNESS AT LEAST 1.1 MM, PRESENT  ON THE DEEP AND   PERIPHERAL MARGIN, SEE NOTE.     Note: Microscopic examination reveals a specimen that extends into the mid reticular dermis. There is an asymmetric  proliferation of nested and single atypical melanocytes along the dermal-epidermal  junction. There are spindled melanocytes in the dermis. The melanocytes stain with antibodies against SOX-10 and Melan-A.     Electronically Signed Out by EDUARDO MATUTE M.D  =========================================================  Name VILMA MARES                                                                                                   Accession #: [id number]            Pathologist:                   EDUARDO MATUTE MD  Date of Procedure:    5/12/2021  Date Received:          5/17/2021  Date Reported           5/25/2021  Submitting Physician:   RAIZA ESTRELLA MD  Location:                    OR  Other External #    Procedures/Addenda Present    FINAL DIAGNOSIS  A.  NODE, RIGHT PAROTID SENTINEL LYMPH NODE (55 COUNT), EXCISION:  CAPSULAR NEVUS.    B.  NODE, SENTINEL LYMPH NODE (184 COUNT), EXCISION:  BENIGN LYMPH NODE.    C.  NODE, RIGHT EXTERNAL JUGULAR NODE (303 COUNT), EXCISION:  CAPSULAR AND TRABECULAR MELANOCYTES, SEE NOTE.    Note: Microscopic examination reveals a lymph node with parotid. There are both  capsular and trabecular melanocytes. These cells stain with antibodies against  Melan-A and SOX-10. The capsular melanocytes appear benign compared to the  primary melanoma in [id number], however some of the trabecular melanocytes are  spindled and larger than the capsular melanocytes and resemble the cytology of  small of the primary melanoma. Therefore, metastatic malignant melanoma in 1/1  lymph node is favored.                  Electronically Signed Out by EDUARDO MATUTE M.D.  ========================================================  Collected 4/1/2024 13:43    Status: Final result    Visible to patient: Yes (seen)    0 Result Notes      Component    FINAL DIAGNOSIS      RIGHT PARAPAROTID/CERVICAL NODE, BIOPSY:  -- Core biopsy fragments with parotid gland, focal lymphoid and fibrotic tissue, negative for  melanoma.See note.   -- Focal benign epithelial lining.     Note: The patient's history of melanoma is noted.  The tissue fragments show no evidence of melanoma by immunohistochemical staining (SOX10 and Melan-A are negative).       A focus of benign epithelial lining with associated lymphoid stroma is seen, which may represent a cyst or salivary duct wall. Clinical correlation is recommended.     : Dr. Yarelis Krueger.      radha   Electronically signed by Jennifer Rodriguez DO on 4/4/2024 at 1705        Assessment and Plan:   Assessment/Plan   Mr. Elliot Donato is a 54 y.o. male with a diagnosis of stage III melanoma of the face. WLE and WLNB 05/12/2021. Completed 18 cycles of Pembrolizumab 200mg on 09/02/2022. Currently on surveillance. Please see the evolution of the case listed above in the cancer history.      # Cutaneous Melanoma IIIA  - CT scans 09/24/2024 showing stable changes.   - Biopsy of right parotid node negative for melanoma.   - Continue skin checks with dermatology.   - RTC in one year on 09/26/2025.  - CT scan and labs prior 09/22/2025.     # Cystic lesion in pancreas  - Met with LUIS EDUARDO Roa for evaluation.   - MRCP 11/02/2023 showing no lesion or ductal dilation. Possible lesion seen on CT could be a fat lobule. We will continue to monitor on CT scans.      # Health maintenance  - Following with PCP for age appropriate cancer screenings.     DISCLAIMER:   In preparing for this visit and writing this note, I reviewed all the previous electronic medical records (labs, imaging and medical charts) of the patient available in the physician portal. Significant findings which helped in decision making are recorded  in this chart.     The plan was discussed with the patient. We gave him ample opportunities to ask questions. All questions were answered to his satisfaction and he verbalized understanding.      INSTRUCTIONS FOR PATIENT  Mr. Elliot Donato ,  It was a pleasure talking to you  today.    As discussed, we will meet again in one year on 09/26/2025. Please have a CT scan and labs 09/22/2025. Please keep your appointments with dermatology and surgical oncology.     Please make sure to schedule your appointments as we discussed. Your appointments should also appear in your MyChart.   In case of an emergency please dial 911 or report to your nearest Emergency Room.  For all other questions, please do not hesitate to reach out to us at the number listed below.    Thank you for choosing [facility] [place] at [facility].   We appreciate your visit.     MD Gia Garcia, ANNA Mejias, RN     Sarcoma and Cutaneous Oncology  [facility]    Phone: [phone]  Fax: [phone]

## 2024-09-27 ENCOUNTER — APPOINTMENT (OUTPATIENT)
Dept: HEMATOLOGY/ONCOLOGY | Facility: CLINIC | Age: 54
End: 2024-09-27
Payer: COMMERCIAL

## 2024-11-20 ENCOUNTER — TELEPHONE (OUTPATIENT)
Dept: DERMATOLOGY | Facility: CLINIC | Age: 54
End: 2024-11-20
Payer: COMMERCIAL

## 2024-11-20 NOTE — TELEPHONE ENCOUNTER
Pt called stating he saw Dr. Astudillo in June for skin check appointment and has appointment scheduled for June 2025. However he and his Oncologist feel he should have skin checks every 6 months due to the patient having had a melanoma before. Pt would like to be seen before the end of the year if possible.

## 2025-01-28 ENCOUNTER — APPOINTMENT (OUTPATIENT)
Dept: DERMATOLOGY | Facility: CLINIC | Age: 55
End: 2025-01-28
Payer: COMMERCIAL

## 2025-01-28 DIAGNOSIS — L81.4 LENTIGO: ICD-10-CM

## 2025-01-28 DIAGNOSIS — Z12.83 SCREENING EXAM FOR SKIN CANCER: ICD-10-CM

## 2025-01-28 DIAGNOSIS — D22.9 MULTIPLE BENIGN NEVI: ICD-10-CM

## 2025-01-28 DIAGNOSIS — D48.5 NEOPLASM OF UNCERTAIN BEHAVIOR OF SKIN: Primary | ICD-10-CM

## 2025-01-28 DIAGNOSIS — Z85.828 PERSONAL HISTORY OF SKIN CANCER: ICD-10-CM

## 2025-01-28 DIAGNOSIS — L57.8 ACTINIC SKIN DAMAGE: ICD-10-CM

## 2025-01-28 DIAGNOSIS — L82.1 SEBORRHEIC KERATOSIS: ICD-10-CM

## 2025-01-28 ASSESSMENT — DERMATOLOGY QUALITY OF LIFE (QOL) ASSESSMENT
RATE HOW EMOTIONALLY BOTHERED YOU ARE BY YOUR SKIN PROBLEM (FOR EXAMPLE, WORRY, EMBARRASSMENT, FRUSTRATION): 0 - NEVER BOTHERED
DATE THE QUALITY-OF-LIFE ASSESSMENT WAS COMPLETED: 67233
RATE HOW BOTHERED YOU ARE BY EFFECTS OF YOUR SKIN PROBLEMS ON YOUR ACTIVITIES (EG, GOING OUT, ACCOMPLISHING WHAT YOU WANT, WORK ACTIVITIES OR YOUR RELATIONSHIPS WITH OTHERS): 0 - NEVER BOTHERED
ARE THERE EXCLUSIONS OR EXCEPTIONS FOR THE QUALITY OF LIFE ASSESSMENT: NO
RATE HOW BOTHERED YOU ARE BY SYMPTOMS OF YOUR SKIN PROBLEM (EG, ITCHING, STINGING BURNING, HURTING OR SKIN IRRITATION): 0 - NEVER BOTHERED

## 2025-01-28 ASSESSMENT — DERMATOLOGY PATIENT ASSESSMENT: DO YOU HAVE ANY NEW OR CHANGING LESIONS: NO

## 2025-01-28 ASSESSMENT — PATIENT GLOBAL ASSESSMENT (PGA): PATIENT GLOBAL ASSESSMENT: PATIENT GLOBAL ASSESSMENT:  1 - CLEAR

## 2025-01-28 ASSESSMENT — ITCH NUMERIC RATING SCALE: HOW SEVERE IS YOUR ITCHING?: 0

## 2025-01-28 NOTE — PROGRESS NOTES
Subjective     Elliot Donato is a 55 y.o. male who presents for the following: Skin Check (Pt has a personal and Family Hx of Melanoma. Pt concerned about spot on his Lt Face.).     Review of Systems:  No other skin or systemic complaints other than what is documented elsewhere in the note.    The following portions of the chart were reviewed this encounter and updated as appropriate:         Skin Cancer History  No skin cancer on file.      Specialty Problems          Dermatology Problems    Neoplasm of uncertain behavior of skin    Unspecified open wound of lip, subsequent encounter    Melanoma metastatic to lymph node (Multi)    Melanoma of skin (Multi)    History of malignant melanoma of skin        Objective   Well appearing patient in no apparent distress; mood and affect are within normal limits.    A full examination was performed including scalp, head, eyes, ears, nose, lips, neck, chest, axillae, abdomen, back, buttocks, bilateral upper extremities, bilateral lower extremities, hands, feet, fingers, toes, fingernails, and toenails. All findings within normal limits unless otherwise noted below.    Assessment/Plan   1. Neoplasm of uncertain behavior of skin  Right Malar Cheek  Well healed scar. There may be a little papule in the inferior lateral edge of it and I dont know if its been there or not          Lesion biopsy  Type of biopsy:punch  3mm  5-0 fas gut used  Informed consent: discussed and consent obtained    Timeout: patient name, date of birth, surgical site, and procedure verified    Procedure prep:  Patient was prepped and draped  Anesthesia: the lesion was anesthetized in a standard fashion    Anesthetic:  1% lidocaine w/ epinephrine 1-100,000 local infiltration  Instrument used: DermaBlade    Hemostasis achieved with: aluminum chloride    Outcome: patient tolerated procedure well    Post-procedure details: sterile dressing applied and wound care instructions given    Dressing type: petrolatum  and bandage      Specimen 1 - Dermatopathology- DERM LAB  Differential Diagnosis: favor scar  Check Margins Yes/No?:    Comments:    Dermpath Lab: Routine Histopathology (formalin-fixed tissue)    2. Multiple benign nevi (2)  Left Breast, Right Upper Back  Brown and tan macules and papules with reassuring findings on dermoscopy    -These lesions have benign, reassuring patterns on dermoscopy  -Recommend continued self observation, and to contact the office if any changes in nevi are noticed    3. Seborrheic keratosis  Left Upper Back  Stuck on, waxy macule(s)/papule(s)/plaque(s) with comedo-like openings and milia like cysts    -Discussed the nature of the diagnosis  -Reassurance, recommend continued observation    4. 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 Patient verbalizes understanding    5. Screening exam for skin cancer      Full body skin exam  - The ugly duckling sign was discussed. Monitor for any skin lesions that are different in color, shape, or size than others on body  -Sun protection was discussed. Recommend SPF 30+, hats with brims, sun protective clothing, and avoiding sun exposure between 10 AM and 2 PM whenever possible  -Recommend regular skin exams or sooner if new or changing lesions       6. Lentigo  Head - Anterior (Face)  Tan macules    -Benign appearing on exam  -Reassurance, recommend observation    7. Actinic skin damage  Neck - Posterior  Background of photodamage with hyper- and hypo-pigmented macules on the skin

## 2025-01-30 LAB
LABORATORY COMMENT REPORT: NORMAL
PATH REPORT.FINAL DX SPEC: NORMAL
PATH REPORT.GROSS SPEC: NORMAL
PATH REPORT.MICROSCOPIC SPEC OTHER STN: NORMAL
PATH REPORT.RELEVANT HX SPEC: NORMAL
PATH REPORT.TOTAL CANCER: NORMAL

## 2025-03-31 ENCOUNTER — OFFICE VISIT (OUTPATIENT)
Dept: PRIMARY CARE | Facility: CLINIC | Age: 55
End: 2025-03-31
Payer: COMMERCIAL

## 2025-03-31 VITALS
WEIGHT: 203 LBS | HEIGHT: 70 IN | SYSTOLIC BLOOD PRESSURE: 134 MMHG | BODY MASS INDEX: 29.06 KG/M2 | OXYGEN SATURATION: 98 % | TEMPERATURE: 97.8 F | HEART RATE: 74 BPM | RESPIRATION RATE: 18 BRPM | DIASTOLIC BLOOD PRESSURE: 88 MMHG

## 2025-03-31 DIAGNOSIS — Z00.00 ANNUAL PHYSICAL EXAM: Primary | ICD-10-CM

## 2025-03-31 DIAGNOSIS — Z12.5 SCREENING PSA (PROSTATE SPECIFIC ANTIGEN): ICD-10-CM

## 2025-03-31 PROBLEM — C77.9 MELANOMA METASTATIC TO LYMPH NODE (MULTI): Status: RESOLVED | Noted: 2024-02-14 | Resolved: 2025-03-31

## 2025-03-31 PROCEDURE — 99396 PREV VISIT EST AGE 40-64: CPT | Performed by: NURSE PRACTITIONER

## 2025-03-31 PROCEDURE — 1036F TOBACCO NON-USER: CPT | Performed by: NURSE PRACTITIONER

## 2025-03-31 PROCEDURE — 3008F BODY MASS INDEX DOCD: CPT | Performed by: NURSE PRACTITIONER

## 2025-03-31 ASSESSMENT — ENCOUNTER SYMPTOMS
NAUSEA: 0
CONSTIPATION: 0
FEVER: 0
COUGH: 0
SHORTNESS OF BREATH: 0
FATIGUE: 0
HEMATOLOGIC/LYMPHATIC NEGATIVE: 1
PALPITATIONS: 0
NUMBNESS: 0
PHOTOPHOBIA: 0
VOMITING: 0
DIARRHEA: 0
DIAPHORESIS: 0
DIZZINESS: 0
LIGHT-HEADEDNESS: 0
PSYCHIATRIC NEGATIVE: 1
WHEEZING: 0
WEAKNESS: 0
MUSCULOSKELETAL NEGATIVE: 1
ALLERGIC/IMMUNOLOGIC NEGATIVE: 1

## 2025-03-31 ASSESSMENT — PAIN SCALES - GENERAL: PAINLEVEL_OUTOF10: 4

## 2025-03-31 NOTE — PROGRESS NOTES
"Chief Complaint  Elliot Donato is a 55 y.o. male presenting for \"Annual Exam.\"    HPI     Elliot Donato is a 55 y.o. male presenting for a physical and labs.  In remission from melanoma.        Past Medical History  Patient Active Problem List    Diagnosis Date Noted    Age-related nuclear cataract of both eyes 01/19/2024    Meibomian gland dysfunction 01/19/2024    Chalazion of left lower eyelid 01/19/2024    Refractive error 01/19/2024    Kidney stones 06/04/2024    Chronic anal fissure 05/28/2024    Dysuria 05/28/2024    Elevated blood pressure reading without diagnosis of hypertension 05/28/2024    Fatty liver 05/28/2024    History of malignant melanoma of skin 05/28/2024    Hx of pulmonary embolus 05/28/2024    Hyperlipidemia 05/28/2024    Left flank pain 05/28/2024    Mass of right parotid gland 05/28/2024    Overweight with body mass index (BMI) 25.0-29.9 05/28/2024    Pancreatic cyst ([facility]) 05/28/2024    Sciatica 05/28/2024    Urethral stricture 05/28/2024    UTI (urinary tract infection) 05/28/2024    Chalazion left upper eyelid 02/22/2024    Melanoma of skin (Multi) 02/14/2024    Nonintractable headache 02/14/2024    Dizziness 02/08/2024    Unspecified open wound of lip, subsequent encounter 06/20/2023    Hyperglycemia 03/21/2023    Neoplasm of uncertain behavior of skin 12/09/2021    Contracture of elbow 11/12/2012    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    multivit with min-folic acid 0.4 mg tablet Every 24 hours        Surgical History  He has a past surgical history that includes Other surgical history (09/03/2020); Lymph node biopsy (2021); Cholecystectomy (2010); Vasectomy " (2006); and Fracture surgery (2010).     Social History  He reports that he quit smoking about 31 years ago. His smoking use included cigarettes. He started smoking about 41 years ago. He has a 10 pack-year smoking history. He has been exposed to tobacco smoke. He has never used smokeless tobacco. He reports current alcohol use of about 2.0 standard drinks of alcohol per week. He reports that he does not use drugs.    Family History  Family History   Problem Relation Name Age of Onset    Diabetes Mother Ольга Donato     Cancer Mother Ольга Donato     Hypertension Mother Ольга Donato     Stroke Mother Ольга Donato     Atrial fibrillation Father Michele Donato     Heart disease Father Michele Donato     Hypertension Father Michele Donato     Hypertension Sister Dianna Donato     Intellectual Disability Sister Dianna Donato     Hypertension Brother John Lopezhner     Hypertension Brother Sesar Fechner     Hypertension Sister Dianna Fechner     Intellectual Disability Sister Dianna Lopezhncathy     Hypertension Brother Jhon Lopezhncathy     Hypertension Brother Sesar Mezaer         Allergies  Patient has no known allergies.    ROS  Review of Systems   Constitutional:  Negative for diaphoresis, fatigue and fever.   HENT: Negative.     Eyes:  Negative for photophobia and visual disturbance.   Respiratory:  Negative for cough, shortness of breath and wheezing.    Cardiovascular:  Negative for chest pain and palpitations.   Gastrointestinal:  Negative for constipation, diarrhea, nausea and vomiting.   Genitourinary: Negative.    Musculoskeletal: Negative.    Skin: Negative.    Allergic/Immunologic: Negative.    Neurological:  Negative for dizziness, weakness, light-headedness and numbness.   Hematological: Negative.    Psychiatric/Behavioral: Negative.          Last Recorded Vitals  /88 (BP Location: Left arm, Patient Position: Sitting, BP Cuff Size: Small adult)   Pulse 74   Temp 36.6 °C (97.8 °F)    Resp 18   Wt 92.1 kg (203 lb)   SpO2 98%     Physical Exam  Vitals and nursing note reviewed.   Constitutional:       Appearance: Normal appearance.   HENT:      Head: Normocephalic and atraumatic.      Right Ear: Tympanic membrane, ear canal and external ear normal.      Left Ear: Tympanic membrane, ear canal and external ear normal.      Nose: Nose normal.      Mouth/Throat:      Mouth: Mucous membranes are moist.      Pharynx: Oropharynx is clear.   Eyes:      Extraocular Movements: Extraocular movements intact.      Conjunctiva/sclera: Conjunctivae normal.      Pupils: Pupils are equal, round, and reactive to light.   Neck:      Thyroid: No thyromegaly.   Cardiovascular:      Rate and Rhythm: Normal rate and regular rhythm.      Pulses: Normal pulses.      Heart sounds: Normal heart sounds, S1 normal and S2 normal.   Pulmonary:      Effort: Pulmonary effort is normal.      Breath sounds: Normal breath sounds. No wheezing or rhonchi.   Abdominal:      General: Bowel sounds are normal.      Palpations: Abdomen is soft. There is no mass.      Tenderness: There is no abdominal tenderness. There is no guarding.   Genitourinary:     Comments: Not examined  Musculoskeletal:         General: Normal range of motion.      Cervical back: Normal range of motion.      Right lower leg: No edema.      Left lower leg: No edema.   Lymphadenopathy:      Cervical: No cervical adenopathy.   Skin:     General: Skin is warm and dry.      Capillary Refill: Capillary refill takes less than 2 seconds.      Findings: No rash.   Neurological:      General: No focal deficit present.      Mental Status: He is alert and oriented to person, place, and time. Mental status is at baseline.      Cranial Nerves: Cranial nerves 2-12 are intact. No cranial nerve deficit.      Sensory: Sensation is intact.      Motor: Motor function is intact.      Coordination: Coordination is intact.      Gait: Gait is intact.   Psychiatric:         Mood and  Affect: Mood normal.         Behavior: Behavior normal.         Thought Content: Thought content normal.         Judgment: Judgment normal.         Relevant Results      Assessment/Plan   Elliot was seen today for annual exam.  Diagnoses and all orders for this visit:  Annual physical exam (Primary)  -     Comprehensive Metabolic Panel; Future  -     Lipid Panel; Future  -     Comprehensive Metabolic Panel  -     Lipid Panel  Screening PSA (prostate specific antigen)  -     Prostate Specific Antigen; Future  -     Prostate Specific Antigen          COUNSELING      Medication education:              Education:  The patient is counseled regarding potential side-effects of any and all new medications             Understanding:  Patient expressed understanding             Adherence:  No barriers to adherence identified        Bethany Bertrand, APRN-CNP

## 2025-04-01 LAB
ALBUMIN SERPL-MCNC: 4.7 G/DL (ref 3.6–5.1)
ALP SERPL-CCNC: 57 U/L (ref 35–144)
ALT SERPL-CCNC: 26 U/L (ref 9–46)
ANION GAP SERPL CALCULATED.4IONS-SCNC: 6 MMOL/L (CALC) (ref 7–17)
AST SERPL-CCNC: 19 U/L (ref 10–35)
BILIRUB SERPL-MCNC: 1.3 MG/DL (ref 0.2–1.2)
BUN SERPL-MCNC: 15 MG/DL (ref 7–25)
CALCIUM SERPL-MCNC: 9.6 MG/DL (ref 8.6–10.3)
CHLORIDE SERPL-SCNC: 102 MMOL/L (ref 98–110)
CHOLEST SERPL-MCNC: 200 MG/DL
CHOLEST/HDLC SERPL: 3.8 (CALC)
CO2 SERPL-SCNC: 30 MMOL/L (ref 20–32)
CREAT SERPL-MCNC: 0.92 MG/DL (ref 0.7–1.3)
EGFRCR SERPLBLD CKD-EPI 2021: 98 ML/MIN/1.73M2
GLUCOSE SERPL-MCNC: 78 MG/DL (ref 65–99)
HDLC SERPL-MCNC: 53 MG/DL
LDLC SERPL CALC-MCNC: 123 MG/DL (CALC)
NONHDLC SERPL-MCNC: 147 MG/DL (CALC)
POTASSIUM SERPL-SCNC: 4.2 MMOL/L (ref 3.5–5.3)
PROT SERPL-MCNC: 7 G/DL (ref 6.1–8.1)
PSA SERPL-MCNC: 0.52 NG/ML
SODIUM SERPL-SCNC: 138 MMOL/L (ref 135–146)
TRIGL SERPL-MCNC: 129 MG/DL

## 2025-05-29 ENCOUNTER — OFFICE VISIT (OUTPATIENT)
Dept: OPHTHALMOLOGY | Facility: CLINIC | Age: 55
End: 2025-05-29
Payer: COMMERCIAL

## 2025-05-29 DIAGNOSIS — H00.14 CHALAZION LEFT UPPER EYELID: Primary | ICD-10-CM

## 2025-05-29 PROCEDURE — 99213 OFFICE O/P EST LOW 20 MIN: CPT | Performed by: OPHTHALMOLOGY

## 2025-05-29 RX ORDER — TOBRAMYCIN AND DEXAMETHASONE 3; 1 MG/ML; MG/ML
1 SUSPENSION/ DROPS OPHTHALMIC 3 TIMES DAILY
Qty: 5 ML | Refills: 0 | Status: SHIPPED | OUTPATIENT
Start: 2025-05-29

## 2025-05-29 ASSESSMENT — ENCOUNTER SYMPTOMS
RESPIRATORY NEGATIVE: 0
CARDIOVASCULAR NEGATIVE: 0
HEMATOLOGIC/LYMPHATIC NEGATIVE: 0
MUSCULOSKELETAL NEGATIVE: 0
EYES NEGATIVE: 0
ENDOCRINE NEGATIVE: 0
NEUROLOGICAL NEGATIVE: 0
GASTROINTESTINAL NEGATIVE: 0
CONSTITUTIONAL NEGATIVE: 0
PSYCHIATRIC NEGATIVE: 0
ALLERGIC/IMMUNOLOGIC NEGATIVE: 0

## 2025-05-29 ASSESSMENT — VISUAL ACUITY
OS_SC: 20/30
METHOD: SNELLEN - LINEAR
OS_SC+: -1
OD_SC: 20/20
OD_SC+: -1

## 2025-05-29 ASSESSMENT — PATIENT HEALTH QUESTIONNAIRE - PHQ9
2. FEELING DOWN, DEPRESSED OR HOPELESS: NOT AT ALL
1. LITTLE INTEREST OR PLEASURE IN DOING THINGS: NOT AT ALL
SUM OF ALL RESPONSES TO PHQ9 QUESTIONS 1 AND 2: 0

## 2025-05-29 ASSESSMENT — EXTERNAL EXAM - LEFT EYE: OS_EXAM: NORMAL

## 2025-05-29 ASSESSMENT — PAIN SCALES - GENERAL: PAINLEVEL_OUTOF10: 0-NO PAIN

## 2025-05-29 ASSESSMENT — EXTERNAL EXAM - RIGHT EYE: OD_EXAM: NORMAL

## 2025-05-29 NOTE — PATIENT INSTRUCTIONS
Thank you so much for choosing me to provide your care today!    If you were dilated your vision may remain blurry   or light sensitive for several hours.    The nature of eye and vision problems can require frequent follow up, please make every effort to adhere to any future appointments.    If you have any issues, questions, or concerns,   please do not hesitate to reach out.    If you receive a survey in regards to your care today, please mention any exceptional care my office staff and/or technicians provided.    You can reach our office at this number:    [phone]    Please consider signing up for and utilizing [employer]!  This is the best way to directly reach me or other  providers

## 2025-05-29 NOTE — PROGRESS NOTES
Assessment/Plan   Problem List Items Addressed This Visit       Chalazion left upper eyelid - Primary    Recurrent upper lid chalazion left eye (OS). Will start course of tobradex and plan to continue warm compress. Advised if poor resolution could consider referral to oculoplastics         Relevant Medications    tobramycin-dexamethasone (Tobradex) ophthalmic suspension       Provided reassurance regarding above diagnoses and care received in the office visit today. Discussed outcomes and options along with the importance of treatment compliance. Understands the importance of any follow up visits. Patient instructed to call/communicate with our office if any new issues, questions, or concerns.     Will plan to see back in few weeks short check or sooner PRN      
- - -

## 2025-06-04 ENCOUNTER — APPOINTMENT (OUTPATIENT)
Dept: UROLOGY | Facility: CLINIC | Age: 55
End: 2025-06-04
Payer: COMMERCIAL

## 2025-06-11 ENCOUNTER — APPOINTMENT (OUTPATIENT)
Dept: UROLOGY | Facility: CLINIC | Age: 55
End: 2025-06-11
Payer: COMMERCIAL

## 2025-06-11 VITALS — HEIGHT: 71 IN | BODY MASS INDEX: 28.14 KG/M2 | WEIGHT: 201 LBS

## 2025-06-11 DIAGNOSIS — N20.0 KIDNEY STONE: Primary | ICD-10-CM

## 2025-06-11 DIAGNOSIS — N40.1 BENIGN PROSTATIC HYPERPLASIA WITH URINARY OBSTRUCTION: ICD-10-CM

## 2025-06-11 DIAGNOSIS — N13.8 BENIGN PROSTATIC HYPERPLASIA WITH URINARY OBSTRUCTION: ICD-10-CM

## 2025-06-11 LAB
POC APPEARANCE, URINE: CLEAR
POC BILIRUBIN, URINE: NEGATIVE
POC BLOOD, URINE: NEGATIVE
POC COLOR, URINE: YELLOW
POC GLUCOSE, URINE: NEGATIVE MG/DL
POC KETONES, URINE: NEGATIVE MG/DL
POC LEUKOCYTES, URINE: NEGATIVE
POC NITRITE,URINE: NEGATIVE
POC PH, URINE: 7 PH
POC PROTEIN, URINE: NEGATIVE MG/DL
POC SPECIFIC GRAVITY, URINE: 1.01
POC UROBILINOGEN, URINE: 0.2 EU/DL

## 2025-06-11 PROCEDURE — 81003 URINALYSIS AUTO W/O SCOPE: CPT | Performed by: PHYSICIAN ASSISTANT

## 2025-06-11 PROCEDURE — 1036F TOBACCO NON-USER: CPT | Performed by: PHYSICIAN ASSISTANT

## 2025-06-11 PROCEDURE — 3008F BODY MASS INDEX DOCD: CPT | Performed by: PHYSICIAN ASSISTANT

## 2025-06-11 PROCEDURE — 99214 OFFICE O/P EST MOD 30 MIN: CPT | Performed by: PHYSICIAN ASSISTANT

## 2025-06-11 ASSESSMENT — ENCOUNTER SYMPTOMS
CONSTITUTIONAL NEGATIVE: 1
GASTROINTESTINAL NEGATIVE: 1
PSYCHIATRIC NEGATIVE: 1
RESPIRATORY NEGATIVE: 1
CARDIOVASCULAR NEGATIVE: 1
NEUROLOGICAL NEGATIVE: 1
ALLERGIC/IMMUNOLOGIC NEGATIVE: 1
ENDOCRINE NEGATIVE: 1
HEMATOLOGIC/LYMPHATIC NEGATIVE: 1
EYES NEGATIVE: 1
MUSCULOSKELETAL NEGATIVE: 1

## 2025-06-11 NOTE — PROGRESS NOTES
Subjective   Patient ID: Elliot Donato is a 55 y.o. male who presents for kidney stone .  HPI  Patient is a 53 yo male with hx of melanoma treated with immunotherapy, recurrent UTIs, mild stricture, hx of kidney stones.    Patient doing well symptomatically. He denies bothersome urinary difficulties. He denies flank pain nausea or vomiting. He denies hematuria or dysuria.     Patient denies any UTIs since last seen. He noticed staying hydrated helps prevent UTIs.     UA trace of blood    Lab Results   Component Value Date    PSA 0.52 03/31/2025    PSA 0.60 09/05/2024    PSA 0.6 03/21/2023    PSA 0.6 02/01/2022    PSA 1.0 01/12/2021    PSA 0.6 12/27/2019    PSA 0.5 11/21/2018       Patient had a CT of the abd and pelvis for melanoma surveillance. It was negative for renal stones.   Study Result  CT chest abdomen pelvis w IV contrast  Status: Final result     PACS Images     Show images for CT chest abdomen pelvis w IV contrast  Signed by    Signed Time Phone Pager   Mehdi Barros MD 9/24/2024 23:24 [phone]     Exam Information    Status Exam Begun Exam Ended   Final 9/23/2024 14:39 9/23/2024 14:55     Study Result    Narrative & Impression   Interpreted By:  Mehdi Barros and Maltbie Grace   STUDY:  CT CHEST ABDOMEN PELVIS W IV CONTRAST;  9/23/2024 2:55 pm      INDICATION:  Signs/Symptoms:see dx.      ,C43.39 Malignant melanoma of other parts of face (Multi)      COMPARISON:  CT neck 09/23/2024 and 09/18/2023  CT chest abdomen pelvis 09/18/2023  MRI pancreas 11/01/2022      ACCESSION NUMBER(S):  [id number]      ORDERING CLINICIAN:  FACUNDO JACKSON      TECHNIQUE:  CT of the chest, abdomen, and pelvis was performed.  Contiguous axial  images were obtained at 3 mm slice thickness through the chest,  abdomen and pelvis. Coronal and sagittal reconstructions at 3 mm  slice thickness were performed.  90 ml of contrast Omnipaque 350 were administered intravenously  without immediate  complication.      FINDINGS:  CHEST:      LUNG/PLEURA/LARGE AIRWAYS:  No consolidation, pleural effusion or pneumothorax.      Pulmonary nodules remain stable, for example as seen on series 202:  *Left lower lobe 3 mm nodule, image 142  *Left lower lobe 4 mm pleural-based nodule/focal atelectasis, image  163 *Left lower lobe 7 mm nodule/focal atelectasis, image 212      No new or enlarging lung nodule.  VESSELS:  Aorta and pulmonary arteries are normal caliber.  No atherosclerotic  changes of the aorta are identified.  No coronary artery  calcifications are present.      HEART:  The heart is normal in size.  No pericardial effusion      MEDIASTINUM AND GABBY:  No mediastinal, hilar or axillary lymphadenopathy is present.  The  esophagus is normal.      CHEST WALL AND LOWER NECK:  Mild bilateral gynecomastia. The visualized thyroid gland appears  within normal limits.      ABDOMEN:      LIVER:  The liver is normal in size without evidence of focal liver lesions.  The liver is diffusely decreased in attenuation compatible with  hepatic steatosis.      BILE DUCTS:  The intrahepatic and extrahepatic ducts are not dilated.      GALLBLADDER:  The gallbladder is surgically absent.      PANCREAS:  A stable subcentimeter hypoattenuating focus is seen in the  pancreatic tail, which was investigated with MRI pancreas on  11/01/2022, and was felt to reflect volume averaging versus a small  insinuating fat lobule. There is no pancreatic duct dilatation.      SPLEEN:  Within normal limits. A splenule is seen.      ADRENAL GLANDS:  Within normal limits.      KIDNEYS AND URETERS:  The kidneys are normal in size and enhance symmetrically.  No  hydroureteronephrosis or nephroureterolithiasis is identified.      PELVIS:      BLADDER:  Within normal limits.      REPRODUCTIVE ORGANS:  Stable mild prostatomegaly, measuring 4.9 cm in the transaxial  dimension. The bilateral seminal vesicles are enlarged.      BOWEL:  The stomach is  unremarkable.  The small and large bowel are normal in  caliber and demonstrate no wall thickening.  Normal appendix.          VESSELS:  The aorta and IVC appear normal.      PERITONEUM/RETROPERITONEUM/LYMPH NODES:  There is no free or loculated fluid collection, no free  intraperitoneal air. The retroperitoneum appears normal.  No  abdominopelvic lymphadenopathy is present. Subcentimeter bilateral  inguinal lymph nodes are seen, likely reactive.      BONE AND SOFT TISSUE:  No suspicious osseous lesions are identified.  The abdominal wall  soft tissues appear normal.      IMPRESSION:  Melanoma, restaging scan  1. When compared to prior CT from 09/18/2023, there is no significant  interval change, with no definite evidence of recurrence in the  chest, abdomen or pelvis.  2. Stable pulmonary nodules measuring up to 7 mm, which bears  attention on follow-up imaging.  3. Mild prostatomegaly. Enlargement of the bilateral seminal vesicles  is seen, which may be secondary to prostatomegaly versus urinary  tract infection. Correlation with patient's symptomology/urinalysis  recommended.  4. Hepatic steatosis, please correlate with liver function tests.      I personally reviewed the images/study and I agree with the findings  as stated by Kayla Gregg MD. This study was interpreted at  [place], [place].      MACRO:  None      Signed by: Mehdi Barros 9/24/2024 11:24 PM  Dictation workstation:   [id number]       Review of Systems   Constitutional: Negative.    HENT: Negative.     Eyes: Negative.    Respiratory: Negative.     Cardiovascular: Negative.    Gastrointestinal: Negative.    Endocrine: Negative.    Genitourinary: Negative.    Musculoskeletal: Negative.    Skin: Negative.    Allergic/Immunologic: Negative.    Neurological: Negative.    Hematological: Negative.    Psychiatric/Behavioral: Negative.         Objective   Physical Exam  Constitutional:       General: He is  not in acute distress.     Appearance: Normal appearance.   HENT:      Head: Normocephalic and atraumatic.      Nose: Nose normal.      Mouth/Throat:      Mouth: Mucous membranes are moist.   Cardiovascular:      Rate and Rhythm: Normal rate.   Pulmonary:      Effort: Pulmonary effort is normal.   Abdominal:      General: Abdomen is flat.      Palpations: Abdomen is soft.   Genitourinary:     Penis: Normal.       Testes: Normal.   Musculoskeletal:      Cervical back: Normal range of motion.   Neurological:      Mental Status: He is alert.         Assessment/Plan   Problem List Items Addressed This Visit    None  Visit Diagnoses         Codes      Kidney stone    -  Primary N20.0    Relevant Orders    POCT UA Automated manually resulted (Completed)          Renal stones  I advised increasing fluid intake to 2.5 L of water a day and more if working outside. I advised adding tenisha to his water.     I reviewed and discussed last CT scan was negative for renal stones.     Recurrent UTIs  I advised increasing hydration.     Patient due for PSA. Order given.        Mary Kearney PA-C 06/11/25 11:53 AM

## 2025-06-12 ENCOUNTER — APPOINTMENT (OUTPATIENT)
Dept: OPHTHALMOLOGY | Facility: CLINIC | Age: 55
End: 2025-06-12
Payer: COMMERCIAL

## 2025-06-12 DIAGNOSIS — H00.14 CHALAZION LEFT UPPER EYELID: Primary | ICD-10-CM

## 2025-06-12 PROCEDURE — 99213 OFFICE O/P EST LOW 20 MIN: CPT | Performed by: OPHTHALMOLOGY

## 2025-06-12 RX ORDER — TOBRAMYCIN AND DEXAMETHASONE 3; 1 MG/ML; MG/ML
SUSPENSION/ DROPS OPHTHALMIC
Start: 2025-06-12

## 2025-06-12 ASSESSMENT — VISUAL ACUITY
OS_SC+: -2
OD_SC+: -1
OD_SC: 20/20
METHOD: SNELLEN - LINEAR
OS_SC: 20/20

## 2025-06-12 ASSESSMENT — TONOMETRY
IOP_METHOD: GOLDMANN APPLANATION
OS_IOP_MMHG: 16

## 2025-06-12 ASSESSMENT — PAIN SCALES - GENERAL: PAINLEVEL_OUTOF10: 0-NO PAIN

## 2025-06-12 ASSESSMENT — ENCOUNTER SYMPTOMS
EYES NEGATIVE: 0
ENDOCRINE NEGATIVE: 0
NEUROLOGICAL NEGATIVE: 0
CARDIOVASCULAR NEGATIVE: 0
MUSCULOSKELETAL NEGATIVE: 0
PSYCHIATRIC NEGATIVE: 0
RESPIRATORY NEGATIVE: 0
HEMATOLOGIC/LYMPHATIC NEGATIVE: 0
CONSTITUTIONAL NEGATIVE: 0
GASTROINTESTINAL NEGATIVE: 0
ALLERGIC/IMMUNOLOGIC NEGATIVE: 0

## 2025-06-12 ASSESSMENT — EXTERNAL EXAM - LEFT EYE: OS_EXAM: NORMAL

## 2025-06-12 ASSESSMENT — EXTERNAL EXAM - RIGHT EYE: OD_EXAM: NORMAL

## 2025-06-12 NOTE — PROGRESS NOTES
Assessment/Plan   Problem List Items Addressed This Visit       Chalazion left upper eyelid - Primary    Improving on treatment but not resolved. Advised could attempt excision again vs. Oculoplastics eval due to recurrent nature. Elects to try excision here. Will have taper off tobradex and continue heat. See back in few weeks for excision.          Relevant Medications    tobramycin-dexamethasone (Tobradex) ophthalmic suspension       Provided reassurance regarding above diagnoses and care received in the office visit today. Discussed outcomes and options along with the importance of treatment compliance. Understands the importance of any follow up visits. Patient instructed to call/communicate with our office if any new issues, questions, or concerns.     Will plan to see back for chalazion excision MARCOS or sooner PRN

## 2025-06-12 NOTE — ASSESSMENT & PLAN NOTE
Improving on treatment but not resolved. Advised could attempt excision again vs. Oculoplastics eval due to recurrent nature. Elects to try excision here. Will have taper off tobradex and continue heat. See back in few weeks for excision.

## 2025-06-12 NOTE — PATIENT INSTRUCTIONS
Thank you so much for choosing me to provide your care today!    If you were dilated your vision may remain blurry   or light sensitive for several hours.    The nature of eye and vision problems can require frequent follow up, please make every effort to adhere to any future appointments.    If you have any issues, questions, or concerns,   please do not hesitate to reach out.    If you receive a survey in regards to your care today, please mention any exceptional care my office staff and/or technicians provided.    You can reach our office at this number:    [phone]    Please consider signing up for and utilizing [employer]!  This is the best way to directly reach me or other  providers

## 2025-06-17 ENCOUNTER — APPOINTMENT (OUTPATIENT)
Dept: DERMATOLOGY | Facility: CLINIC | Age: 55
End: 2025-06-17
Payer: COMMERCIAL

## 2025-07-01 ENCOUNTER — OFFICE VISIT (OUTPATIENT)
Dept: OPHTHALMOLOGY | Facility: CLINIC | Age: 55
End: 2025-07-01
Payer: COMMERCIAL

## 2025-07-01 DIAGNOSIS — H00.14 CHALAZION LEFT UPPER EYELID: Primary | ICD-10-CM

## 2025-07-01 PROCEDURE — 67800 REMOVE EYELID LESION: CPT | Mod: LEFT SIDE | Performed by: OPHTHALMOLOGY

## 2025-07-01 RX ORDER — TOBRAMYCIN AND DEXAMETHASONE 3; 1 MG/ML; MG/ML
SUSPENSION/ DROPS OPHTHALMIC
Start: 2025-07-01

## 2025-07-01 ASSESSMENT — ENCOUNTER SYMPTOMS
GASTROINTESTINAL NEGATIVE: 0
ALLERGIC/IMMUNOLOGIC NEGATIVE: 0
CONSTITUTIONAL NEGATIVE: 0
NEUROLOGICAL NEGATIVE: 0
ENDOCRINE NEGATIVE: 0
HEMATOLOGIC/LYMPHATIC NEGATIVE: 0
EYES NEGATIVE: 0
RESPIRATORY NEGATIVE: 0
CARDIOVASCULAR NEGATIVE: 0
PSYCHIATRIC NEGATIVE: 0
MUSCULOSKELETAL NEGATIVE: 0

## 2025-07-01 ASSESSMENT — PAIN SCALES - GENERAL: PAINLEVEL_OUTOF10: 0-NO PAIN

## 2025-07-01 NOTE — PROGRESS NOTES
Assessment/Plan   Problem List Items Addressed This Visit       Chalazion left upper eyelid - Primary    Tolerated excision well. Will have restart tobradex TID for 5 days. To call if any issues. Will recheck in post op in few weeks.          Relevant Medications    tobramycin-dexamethasone (Tobradex) ophthalmic suspension    Other Relevant Orders    Excision of Chalazion, Single - OS - Left Eye (Completed)       Provided reassurance regarding above diagnoses and care received in the office visit today. Discussed outcomes and options along with the importance of treatment compliance. Understands the importance of any follow up visits. Patient instructed to call/communicate with our office if any new issues, questions, or concerns.     Will plan to see back in 2 weeks post op or sooner PRN

## 2025-07-01 NOTE — ASSESSMENT & PLAN NOTE
Tolerated excision well. Will have restart tobradex TID for 5 days. To call if any issues. Will recheck in post op in few weeks.

## 2025-07-01 NOTE — PATIENT INSTRUCTIONS
Thank you so much for choosing me to provide your in office procedure today!    Please follow any instructions we discussed for post procedural care.   If you experience any concerning side effects or problems do not hesitate to reach out to our office, even after business hours.    If you were dilated your vision may remain blurry   or light sensitive for several hours.    The nature of eye and vision problems can require frequent follow up, please make every effort to adhere to any future appointments.    If you receive a survey in regards to your care today, please mention any exceptional care my office staff and/or technicians provided.    You can reach our office at this number with any other questions or concerns:  [phone]    Please consider signing up for and utilizing [employer]!  This is the best way to directly reach me or other  providers

## 2025-07-16 ENCOUNTER — APPOINTMENT (OUTPATIENT)
Dept: OPHTHALMOLOGY | Facility: CLINIC | Age: 55
End: 2025-07-16
Payer: COMMERCIAL

## 2025-07-16 DIAGNOSIS — H00.14 CHALAZION LEFT UPPER EYELID: Primary | ICD-10-CM

## 2025-07-16 PROCEDURE — 99211 OFF/OP EST MAY X REQ PHY/QHP: CPT | Performed by: OPHTHALMOLOGY

## 2025-07-16 ASSESSMENT — ENCOUNTER SYMPTOMS
CONSTITUTIONAL NEGATIVE: 0
GASTROINTESTINAL NEGATIVE: 0
ALLERGIC/IMMUNOLOGIC NEGATIVE: 0
HEMATOLOGIC/LYMPHATIC NEGATIVE: 0
EYES NEGATIVE: 0
ENDOCRINE NEGATIVE: 0
PSYCHIATRIC NEGATIVE: 0
NEUROLOGICAL NEGATIVE: 0
MUSCULOSKELETAL NEGATIVE: 0
RESPIRATORY NEGATIVE: 0
CARDIOVASCULAR NEGATIVE: 0

## 2025-07-16 ASSESSMENT — SLIT LAMP EXAM - LIDS: COMMENTS: MEIBOMIAN GLAND DYSFUNCTION

## 2025-07-16 ASSESSMENT — VISUAL ACUITY
OD_SC+: -2
OS_SC: 20/25
OS_SC+: -2
OD_SC: 20/25
METHOD: SNELLEN - LINEAR

## 2025-07-16 ASSESSMENT — PAIN SCALES - GENERAL: PAINLEVEL_OUTOF10: 0-NO PAIN

## 2025-07-16 ASSESSMENT — EXTERNAL EXAM - LEFT EYE: OS_EXAM: NORMAL

## 2025-07-16 ASSESSMENT — EXTERNAL EXAM - RIGHT EYE: OD_EXAM: NORMAL

## 2025-07-16 NOTE — PROGRESS NOTES
Assessment/Plan   Problem List Items Addressed This Visit       Chalazion left upper eyelid - Primary    Slight response to excision but recurrent in same area as previous excision. Advised with multiple attempts over a few years and still residual nodule, will ask for oculoplastics evaluation. Advised to continue warm compress but unsure if would be significantly helpful.             Provided reassurance regarding above diagnoses and care received in the office visit today. Discussed outcomes and options along with the importance of treatment compliance. Understands the importance of any follow up visits. Patient instructed to call/communicate with our office if any new issues, questions, or concerns.     Will plan to see back as scheduled or sooner PRN

## 2025-07-16 NOTE — ASSESSMENT & PLAN NOTE
Slight response to excision but recurrent in same area as previous excision. Advised with multiple attempts over a few years and still residual nodule, will ask for oculoplastics evaluation. Advised to continue warm compress but unsure if would be significantly helpful.

## 2025-07-16 NOTE — PATIENT INSTRUCTIONS
Thank you so much for choosing me to provide your care today!    If you were dilated your vision may remain blurry   or light sensitive for several hours.    The nature of eye and vision problems can require frequent follow up, please make every effort to adhere to any future appointments.    If you have any issues, questions, or concerns,   please do not hesitate to reach out.    If you receive a survey in regards to your care today, please mention any exceptional care my office staff and/or technicians provided.    You can reach our office at this number:    [phone]    Please consider signing up for and utilizing [employer]!  This is the best way to directly reach me or other  providers

## 2025-07-29 ENCOUNTER — APPOINTMENT (OUTPATIENT)
Dept: DERMATOLOGY | Facility: CLINIC | Age: 55
End: 2025-07-29
Payer: COMMERCIAL

## 2025-07-29 DIAGNOSIS — Z85.828 PERSONAL HISTORY OF SKIN CANCER: ICD-10-CM

## 2025-07-29 DIAGNOSIS — Z12.83 SCREENING EXAM FOR SKIN CANCER: ICD-10-CM

## 2025-07-29 DIAGNOSIS — D22.9 MULTIPLE BENIGN NEVI: Primary | ICD-10-CM

## 2025-07-29 DIAGNOSIS — L82.1 SEBORRHEIC KERATOSIS: ICD-10-CM

## 2025-07-29 PROCEDURE — 99213 OFFICE O/P EST LOW 20 MIN: CPT | Performed by: STUDENT IN AN ORGANIZED HEALTH CARE EDUCATION/TRAINING PROGRAM

## 2025-07-29 ASSESSMENT — DERMATOLOGY QUALITY OF LIFE (QOL) ASSESSMENT
DATE THE QUALITY-OF-LIFE ASSESSMENT WAS COMPLETED: 67415
RATE HOW BOTHERED YOU ARE BY EFFECTS OF YOUR SKIN PROBLEMS ON YOUR ACTIVITIES (EG, GOING OUT, ACCOMPLISHING WHAT YOU WANT, WORK ACTIVITIES OR YOUR RELATIONSHIPS WITH OTHERS): 0 - NEVER BOTHERED
ARE THERE EXCLUSIONS OR EXCEPTIONS FOR THE QUALITY OF LIFE ASSESSMENT: NO
RATE HOW BOTHERED YOU ARE BY SYMPTOMS OF YOUR SKIN PROBLEM (EG, ITCHING, STINGING BURNING, HURTING OR SKIN IRRITATION): 0 - NEVER BOTHERED
WHAT SINGLE SKIN CONDITION LISTED BELOW IS THE PATIENT ANSWERING THE QUALITY-OF-LIFE ASSESSMENT QUESTIONS ABOUT: NONE OF THE ABOVE
RATE HOW EMOTIONALLY BOTHERED YOU ARE BY YOUR SKIN PROBLEM (FOR EXAMPLE, WORRY, EMBARRASSMENT, FRUSTRATION): 0 - NEVER BOTHERED

## 2025-07-29 ASSESSMENT — DERMATOLOGY PATIENT ASSESSMENT
DO YOU USE A TANNING BED: YES, PREVIOUSLY
DO YOU HAVE ANY NEW OR CHANGING LESIONS: NO
HAVE YOU HAD OR DO YOU HAVE VASCULAR DISEASE: NO
HAVE YOU HAD OR DO YOU HAVE A STAPH INFECTION: NO
DO YOU USE SUNSCREEN: OCCASIONALLY
ARE YOU AN ORGAN TRANSPLANT RECIPIENT: NO

## 2025-07-29 ASSESSMENT — ITCH NUMERIC RATING SCALE: HOW SEVERE IS YOUR ITCHING?: 0

## 2025-07-29 ASSESSMENT — PATIENT GLOBAL ASSESSMENT (PGA): PATIENT GLOBAL ASSESSMENT: PATIENT GLOBAL ASSESSMENT:  1 - CLEAR

## 2025-07-29 NOTE — Clinical Note
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 Patient verbalizes understanding

## 2025-07-29 NOTE — Clinical Note
Full body skin exam  - The ugly duckling sign was discussed. Monitor for any skin lesions that are different in color, shape, or size than others on body  -Sun protection was discussed. Recommend SPF 30+, hats with brims, sun protective clothing, and avoiding sun exposure between 10 AM and 2 PM whenever possible  -Recommend regular skin exams or sooner if new or changing lesions

## 2025-08-05 NOTE — PROGRESS NOTES
Subjective     Elliot Donato is a 55 y.o. male who presents for the following: Skin Check (FBSE, HX of melanoma).     Intake Questions  Do you have any new or changing Lesions?: No  Are you an organ transplant recipient?: No  Have you had or do you have a Staph Infection?: No  Have you had or do you have Vacular Disease?: No  Do you use sunscreen?: Occasionally  Do you use a tanning bed?: Yes, Previously    Review of Systems:  No other skin or systemic complaints other than what is documented elsewhere in the note.    The following portions of the chart were reviewed this encounter and updated as appropriate:          Skin Cancer History  Biopsy Log Book  Biopsied Type Location Status   6/10/24 Atypical Nevus - Mild Mid Back Patient/Caregiver Informed  6/18/24       Additional History      Specialty Problems          Dermatology Problems    Neoplasm of uncertain behavior of skin    Unspecified open wound of lip, subsequent encounter    Melanoma of skin (Multi)    History of malignant melanoma of skin        Objective   Well appearing patient in no apparent distress; mood and affect are within normal limits.    A focused skin examination was performed. All findings within normal limits unless otherwise noted below.    Assessment/Plan   Skin Exam  1. MULTIPLE BENIGN NEVI (2)  Left Breast, Right Upper Back  Brown and tan macules and papules with reassuring findings on dermoscopy  -These lesions have benign, reassuring patterns on dermoscopy  -Recommend continued self observation, and to contact the office if any changes in nevi are noticed  2. SEBORRHEIC KERATOSIS  Left Upper Back  Stuck on, waxy macule(s)/papule(s)/plaque(s) with comedo-like openings and milia like cysts  -Discussed the nature of the diagnosis  -Reassurance, recommend continued observation  3. 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 Patient verbalizes understanding  4. SCREENING EXAM FOR SKIN CANCER  Generalized    Full body skin exam  - The ugly duckling sign was discussed. Monitor for any skin lesions that are different in color, shape, or size than others on body  -Sun protection was discussed. Recommend SPF 30+, hats with brims, sun protective clothing, and avoiding sun exposure between 10 AM and 2 PM whenever possible  -Recommend regular skin exams or sooner if new or changing lesions

## 2025-09-05 ENCOUNTER — APPOINTMENT (OUTPATIENT)
Dept: OPHTHALMOLOGY | Facility: CLINIC | Age: 55
End: 2025-09-05
Payer: COMMERCIAL

## 2025-09-05 ASSESSMENT — REFRACTION_MANIFEST
OD_AXIS: 090
OS_ADD: +2.50
OS_CYLINDER: -0.25
OS_CYLINDER: -0.50
OS_SPHERE: +1.50
OS_AXIS: 090
OD_SPHERE: +1.00
OD_CYLINDER: -0.50
OD_ADD: +2.50
OS_AXIS: 120
OD_CYLINDER: -0.50
METHOD_AUTOREFRACTION: 1
OD_SPHERE: +0.75
OD_AXIS: 085
OS_SPHERE: +1.00

## 2025-09-05 ASSESSMENT — ENCOUNTER SYMPTOMS
HEMATOLOGIC/LYMPHATIC NEGATIVE: 0
CARDIOVASCULAR NEGATIVE: 0
ENDOCRINE NEGATIVE: 0
NEUROLOGICAL NEGATIVE: 0
EYES NEGATIVE: 0
ALLERGIC/IMMUNOLOGIC NEGATIVE: 0
PSYCHIATRIC NEGATIVE: 0
MUSCULOSKELETAL NEGATIVE: 0
CONSTITUTIONAL NEGATIVE: 0
GASTROINTESTINAL NEGATIVE: 0
RESPIRATORY NEGATIVE: 0

## 2025-09-05 ASSESSMENT — PAIN SCALES - GENERAL: PAINLEVEL_OUTOF10: 0-NO PAIN

## 2025-09-05 ASSESSMENT — VISUAL ACUITY
OS_SC: 20/30
OD_SC: 20/20
METHOD_MR: PD 69
METHOD: SNELLEN - LINEAR
OS_SC+: +2

## 2025-09-05 ASSESSMENT — TONOMETRY
OS_IOP_MMHG: 18
IOP_METHOD: GOLDMANN APPLANATION
OD_IOP_MMHG: 18

## 2025-09-05 ASSESSMENT — CUP TO DISC RATIO
OD_RATIO: 0.25
OS_RATIO: 0.25

## 2025-09-05 ASSESSMENT — EXTERNAL EXAM - LEFT EYE: OS_EXAM: NORMAL

## 2025-09-05 ASSESSMENT — SLIT LAMP EXAM - LIDS: COMMENTS: MEIBOMIAN GLAND DYSFUNCTION

## 2025-09-05 ASSESSMENT — EXTERNAL EXAM - RIGHT EYE: OD_EXAM: NORMAL

## 2025-09-08 ENCOUNTER — APPOINTMENT (OUTPATIENT)
Dept: OPHTHALMOLOGY | Facility: CLINIC | Age: 55
End: 2025-09-08
Payer: COMMERCIAL

## 2025-09-26 ENCOUNTER — APPOINTMENT (OUTPATIENT)
Dept: HEMATOLOGY/ONCOLOGY | Facility: CLINIC | Age: 55
End: 2025-09-26
Payer: COMMERCIAL

## 2026-02-16 ENCOUNTER — APPOINTMENT (OUTPATIENT)
Dept: DERMATOLOGY | Facility: CLINIC | Age: 56
End: 2026-02-16
Payer: COMMERCIAL

## 2026-06-16 ENCOUNTER — APPOINTMENT (OUTPATIENT)
Dept: UROLOGY | Facility: CLINIC | Age: 56
End: 2026-06-16
Payer: COMMERCIAL

## 2026-09-14 ENCOUNTER — APPOINTMENT (OUTPATIENT)
Dept: OPHTHALMOLOGY | Facility: CLINIC | Age: 56
End: 2026-09-14
Payer: COMMERCIAL